# Patient Record
Sex: FEMALE | Race: WHITE | Employment: UNEMPLOYED | URBAN - METROPOLITAN AREA
[De-identification: names, ages, dates, MRNs, and addresses within clinical notes are randomized per-mention and may not be internally consistent; named-entity substitution may affect disease eponyms.]

---

## 2019-05-03 ENCOUNTER — HOSPITAL ENCOUNTER (OUTPATIENT)
Dept: LAB | Age: 59
Discharge: HOME | End: 2019-05-03
Payer: MEDICAID

## 2019-05-03 ENCOUNTER — OFFICE VISIT (OUTPATIENT)
Dept: FAMILY MEDICINE CLINIC | Age: 59
End: 2019-05-03

## 2019-05-03 VITALS
DIASTOLIC BLOOD PRESSURE: 75 MMHG | OXYGEN SATURATION: 96 % | TEMPERATURE: 98.1 F | SYSTOLIC BLOOD PRESSURE: 126 MMHG | BODY MASS INDEX: 32.43 KG/M2 | HEART RATE: 107 BPM | RESPIRATION RATE: 16 BRPM | HEIGHT: 63 IN | WEIGHT: 183 LBS

## 2019-05-03 DIAGNOSIS — E11.65 TYPE 2 DIABETES MELLITUS WITH HYPERGLYCEMIA, WITHOUT LONG-TERM CURRENT USE OF INSULIN (HCC): ICD-10-CM

## 2019-05-03 DIAGNOSIS — Z12.31 OTHER SCREENING MAMMOGRAM: ICD-10-CM

## 2019-05-03 DIAGNOSIS — Z00.00 WELL WOMAN EXAM (NO GYNECOLOGICAL EXAM): Primary | ICD-10-CM

## 2019-05-03 DIAGNOSIS — Z00.00 WELL WOMAN EXAM (NO GYNECOLOGICAL EXAM): ICD-10-CM

## 2019-05-03 DIAGNOSIS — Z11.59 NEED FOR HEPATITIS C SCREENING TEST: ICD-10-CM

## 2019-05-03 DIAGNOSIS — B37.9 YEAST INFECTION: ICD-10-CM

## 2019-05-03 PROCEDURE — 87624 HPV HI-RISK TYP POOLED RSLT: CPT

## 2019-05-03 PROCEDURE — 88175 CYTOPATH C/V AUTO FLUID REDO: CPT

## 2019-05-03 RX ORDER — RANITIDINE 150 MG/1
150 TABLET, FILM COATED ORAL 2 TIMES DAILY
COMMUNITY
End: 2019-05-28 | Stop reason: SDUPTHER

## 2019-05-03 RX ORDER — NYSTATIN 100000 U/G
CREAM TOPICAL 2 TIMES DAILY
Qty: 30 G | Refills: 0 | Status: SHIPPED | OUTPATIENT
Start: 2019-05-03 | End: 2019-05-28 | Stop reason: SDUPTHER

## 2019-05-03 RX ORDER — LEVOTHYROXINE SODIUM 100 UG/1
TABLET ORAL
COMMUNITY
End: 2019-05-28 | Stop reason: SDUPTHER

## 2019-05-03 NOTE — PROGRESS NOTES
Subjective:  
63 y.o. female for Well Woman Check. She is postmenopausal. 
Social History: not sexually active. Pertinent past medical hstory: below. There are no active problems to display for this patient. Current Outpatient Medications Medication Sig Dispense Refill  raNITIdine (ZANTAC) 150 mg tablet Take 150 mg by mouth two (2) times a day.  levothyroxine (SYNTHROID) 100 mcg tablet Take  by mouth Daily (before breakfast).  dicyclomine HCl (DICYCLOMINE PO) Take  by mouth.  nystatin (MYCOSTATIN) topical cream Apply  to affected area two (2) times a day. 30 g 0 No Known Allergies Past Medical History:  
Diagnosis Date  Diabetes ([address]  HTN (hypertension)  Obesity (BMI 35.0-39.9 without comorbidity) History reviewed. No pertinent surgical history. Family History Problem Relation Age of Onset  Diabetes Mother  Heart Disease Mother Social History Tobacco Use  Smoking status: Never Smoker  Smokeless tobacco: Never Used Substance Use Topics  Alcohol use: Not Currently ROS:  Feeling well. No dyspnea or chest pain on exertion. No abdominal pain, change in bowel habits, black or bloody stools. No urinary tract symptoms. GYN ROS: no breast pain or new or enlarging lumps on self exam, no vaginal bleeding, no discharge or pelvic pain. Menopausal symptoms: none. No neurological complaints. Last DEXA scan and T, not ordered Last Colonoscopy: new patient Last Mammogram new patiwnt Objective:  
 
Visit Vitals /75 (BP 1 Location: Left arm, BP Patient Position: Sitting) Pulse (!) 107 Temp 98.1 °F (36.7 °C) (Oral) Resp 16 Ht 5' 3\" (1.6 m) Wt 183 lb (83 kg) SpO2 96% BMI 32.42 kg/m² The patient appears well, alert, oriented x 3, in no distress. ENT normal.  Neck supple. No adenopathy or thyromegaly. SAWYER. Lungs are clear, good air entry, no wheezes, rhonchi or rales.  S1 and S2 normal, no murmurs, regular rate and rhythm. Abdomen soft without tenderness, guarding, mass or organomegaly. Extremities show no edema, normal peripheral pulses. Neurological is normal, no focal findings. BREAST EXAM: breasts appear normal, no suspicious masses, no skin or nipple changes or axillary nodes PELVIC EXAM: normal external genitalia, vulva, vagina, cervix, uterus and adnexa, skin with yeast infection Assessment/Plan:  
well woman 
postmenopausal 
pap smear 
return annually or prn 
  ICD-10-CM ICD-9-CM 1. Well woman exam (no gynecological exam) Z00.00 V70.0 PAP IG, HPV AND RFX HPV 73/40,70(997488) 2. Other screening mammogram Z12.31 V76.12 CANCELED: [facility] MAMMOGRAM DIAG BILAT SAME DAY INCL CAD 3. BMI 31.0-31.9,adult Z68.31 V85.31   
4. Type 2 diabetes mellitus with hyperglycemia, without long-term current use of insulin (HCC) E11.65 250.00 LIPID PANEL  
  995.59 METABOLIC PANEL, COMPREHENSIVE  
   HEMOGLOBIN A1C WITH EAG 5. Need for hepatitis C screening test Z11.59 V73.89 HEPATITIS C AB  
6. Yeast infection B37.9 112.9 nystatin (MYCOSTATIN) topical cream  
.follow up in 1 week Pt was given an after visit summary which includes diagnosis, current medicines and vital and voiced understanding of treatment plan

## 2019-05-03 NOTE — PROGRESS NOTES
Cedric Cabrera is a 63 y.o. female Exam RM: 10 Health Maintenance Due Topic Date Due  
 Hepatitis C Screening  1960  
 DTaP/Tdap/Td series (1 - Tdap) 11/27/1982  PAP AKA CERVICAL CYTOLOGY  11/27/1982  Shingrix Vaccine Age 50> (1 of 2) 11/27/2011  BREAST CANCER SCRN MAMMOGRAM  11/27/2011  
 FOBT Q 1 YEAR AGE 50-75  11/27/2011 Visit Vitals /75 (BP 1 Location: Left arm, BP Patient Position: Sitting) Pulse (!) 107 Temp 98.1 °F (36.7 °C) (Oral) Resp 16 Ht 5' 3\" (1.6 m) Wt 183 lb (83 kg) SpO2 96% BMI 32.42 kg/m²

## 2019-05-05 LAB
ALBUMIN SERPL-MCNC: 4.2 G/DL (ref 3.5–5.5)
ALBUMIN/GLOB SERPL: 1.6 {RATIO} (ref 1.2–2.2)
ALP SERPL-CCNC: 147 IU/L (ref 39–117)
ALT SERPL-CCNC: 13 IU/L (ref 0–32)
AST SERPL-CCNC: 11 IU/L (ref 0–40)
BILIRUB SERPL-MCNC: 0.2 MG/DL (ref 0–1.2)
BUN SERPL-MCNC: 9 MG/DL (ref 6–24)
BUN/CREAT SERPL: 13 (ref 9–23)
CALCIUM SERPL-MCNC: 9.2 MG/DL (ref 8.7–10.2)
CHLORIDE SERPL-SCNC: 94 MMOL/L (ref 96–106)
CHOLEST SERPL-MCNC: 222 MG/DL (ref 100–199)
CO2 SERPL-SCNC: 22 MMOL/L (ref 20–29)
CREAT SERPL-MCNC: 0.69 MG/DL (ref 0.57–1)
EST. AVERAGE GLUCOSE BLD GHB EST-MCNC: >398 MG/DL
GLOBULIN SER CALC-MCNC: 2.6 G/DL (ref 1.5–4.5)
GLUCOSE SERPL-MCNC: 439 MG/DL (ref 65–99)
HBA1C MFR BLD: >15.5 % (ref 4.8–5.6)
HCV AB S/CO SERPL IA: <0.1 S/CO RATIO (ref 0–0.9)
HDLC SERPL-MCNC: 62 MG/DL
INTERPRETATION, 910389: NORMAL
LDLC SERPL CALC-MCNC: 135 MG/DL (ref 0–99)
POTASSIUM SERPL-SCNC: 4.9 MMOL/L (ref 3.5–5.2)
PROT SERPL-MCNC: 6.8 G/DL (ref 6–8.5)
SODIUM SERPL-SCNC: 135 MMOL/L (ref 134–144)
TRIGL SERPL-MCNC: 124 MG/DL (ref 0–149)
VLDLC SERPL CALC-MCNC: 25 MG/DL (ref 5–40)

## 2019-05-10 ENCOUNTER — TELEPHONE (OUTPATIENT)
Dept: FAMILY MEDICINE CLINIC | Age: 59
End: 2019-05-10

## 2019-05-10 RX ORDER — METFORMIN HYDROCHLORIDE 500 MG/1
TABLET, EXTENDED RELEASE ORAL
Qty: 120 TAB | Refills: 2 | Status: SHIPPED | OUTPATIENT
Start: 2019-05-10 | End: 2019-11-08 | Stop reason: SDUPTHER

## 2019-05-10 NOTE — TELEPHONE ENCOUNTER
Ariane Mathews, MEKA Sloan LPN   Caller: Unspecified (Today, 10:09 AM)             Spoke with son and lab results given to him

## 2019-05-10 NOTE — TELEPHONE ENCOUNTER
----- Message from Adrian Sebastian sent at 5/10/2019  7:58 AM EDT -----  Regarding: Dr. Burt Mcgee pt's daughter is returning an urgent call back to the office. She stated she  is going into surgery in 5 minutes. [phone] or [phone].

## 2019-05-10 NOTE — TELEPHONE ENCOUNTER
Pt.'s son is calling requesting a call back in regards to get some lab results .      Best call # [phone]

## 2019-05-17 ENCOUNTER — OFFICE VISIT (OUTPATIENT)
Dept: FAMILY MEDICINE CLINIC | Age: 59
End: 2019-05-17

## 2019-05-17 VITALS
BODY MASS INDEX: 31.82 KG/M2 | HEART RATE: 111 BPM | SYSTOLIC BLOOD PRESSURE: 126 MMHG | RESPIRATION RATE: 16 BRPM | HEIGHT: 63 IN | OXYGEN SATURATION: 97 % | DIASTOLIC BLOOD PRESSURE: 70 MMHG | TEMPERATURE: 98.3 F | WEIGHT: 179.6 LBS

## 2019-05-17 DIAGNOSIS — R07.81 PAINFUL RIB: ICD-10-CM

## 2019-05-17 DIAGNOSIS — E78.2 MIXED HYPERLIPIDEMIA: Primary | ICD-10-CM

## 2019-05-17 DIAGNOSIS — E11.65 TYPE 2 DIABETES MELLITUS WITH HYPERGLYCEMIA, WITHOUT LONG-TERM CURRENT USE OF INSULIN (HCC): ICD-10-CM

## 2019-05-17 DIAGNOSIS — F99 PSYCHIATRIC DISORDER: ICD-10-CM

## 2019-05-17 RX ORDER — ATORVASTATIN CALCIUM 20 MG/1
20 TABLET, FILM COATED ORAL DAILY
Qty: 30 TAB | Refills: 3 | Status: SHIPPED | OUTPATIENT
Start: 2019-05-17 | End: 2019-12-01 | Stop reason: SDUPTHER

## 2019-05-17 NOTE — PROGRESS NOTES
All health maintenance and other pertinent information has been reviewed in preparation for today's office visit. Patient presents in the office today for:    Chief Complaint   Patient presents with    Follow-up     2 week follow up (HTN, Diabetes)    Rib Pain     Pt c/o bi-lat rib cage pain  which has been occuring over the last 5 months. Pain radiates to back on both sides Also c/o SOB       1. Have you been to the ER, urgent care clinic since your last visit? Hospitalized since your last visit? No    2. Have you seen or consulted any other health care providers outside of the [address] [place] [address] since your last visit? Include any pap smears or colon screening.  No

## 2019-05-17 NOTE — PROGRESS NOTES
HISTORY OF PRESENT ILLNESS  Benji Marte is a 63 y.o. female. HPI: Patient is following up diabetes and hyperlipidemia. Her HA1C was 15.last office visit currently she is not taking her medication due to fasting. Patient was advised she shouldn't be fasting and that she must taking her medications as prescribed. She also has psychological problem , and taking medication given in [place], she needs to follow up with a psychiatrist.   complaining of bilateral rib pain x 1-2 weeks. Past Medical History:   Diagnosis Date    Diabetes ([address]     HTN (hypertension)     Obesity (BMI 35.0-39.9 without comorbidity)    History reviewed. No pertinent surgical history. No Known Allergies    Current Outpatient Medications:     atorvastatin (LIPITOR) 20 mg tablet, Take 1 Tab by mouth daily. , Disp: 30 Tab, Rfl: 3    metFORMIN ER (GLUCOPHAGE XR) 500 mg tablet, Take 2 pills twice a day, with breakfast and dinner, Disp: 120 Tab, Rfl: 2    raNITIdine (ZANTAC) 150 mg tablet, Take 150 mg by mouth two (2) times a day., Disp: , Rfl:     levothyroxine (SYNTHROID) 100 mcg tablet, Take  by mouth Daily (before breakfast). , Disp: , Rfl:     dicyclomine HCl (DICYCLOMINE PO), Take  by mouth two (2) times a day. Indications: Pt unsure of dosage, Disp: , Rfl:     nystatin (MYCOSTATIN) topical cream, Apply  to affected area two (2) times a day., Disp: 30 g, Rfl: 0  Review of Systems   Constitutional: Negative. Respiratory: Negative. Cardiovascular: Negative. Gastrointestinal: Negative. Blood pressure 126/70, pulse (!) 111, temperature 98.3 °F (36.8 °C), temperature source Oral, resp. rate 16, height 5' 3\" (1.6 m), weight 179 lb 9.6 oz (81.5 kg), SpO2 97 %. Physical Exam   Constitutional: No distress. HENT:   Mouth/Throat: Oropharynx is clear and moist.   Neck: Normal range of motion. Neck supple. Cardiovascular: Normal rate and regular rhythm. No murmur heard.   Pulmonary/Chest: Effort normal and breath sounds normal. She exhibits tenderness. Abdominal: Soft. Bowel sounds are normal.   Skin: Skin is warm and dry. Nursing note and vitals reviewed. ASSESSMENT and PLAN  Diagnoses and all orders for this visit:    1. Mixed hyperlipidemia  -     atorvastatin (LIPITOR) 20 mg tablet; Take 1 Tab by mouth daily. 2. Type 2 diabetes mellitus with hyperglycemia, without long-term current use of insulin (HCC)      Stop fasting     Take metformin 500 mg 2 tabs bid   3. Painful rib    4.  Psychiatric disorder  Refer to psychiatrist  Follow up in [facility]  Pt was given an after visit summary which includes diagnosis, current medicines and vital and voiced understanding of treatment plan

## 2019-05-17 NOTE — LETTER
NOTIFICATION RETURN TO WORK / SCHOOL 
 
5/17/2019 10:00 AM 
 
Ms. Benji Marte [address] [place] [id number] To Whom It May Concern: 
 
Benji Marte is currently under the care of DEBORAH Jc. She is not able to work due to diabetes, vision loss, psychiatric problem If there are questions or concerns please have the patient contact our office. Sincerely, Isidra Farrell NP

## 2019-05-20 RX ORDER — RANITIDINE 150 MG/1
150 TABLET, FILM COATED ORAL 2 TIMES DAILY
Qty: 30 TAB | Refills: 0 | Status: CANCELLED | OUTPATIENT
Start: 2019-05-20

## 2019-05-20 RX ORDER — CHLORPROMAZINE HYDROCHLORIDE 100 MG/1
100 TABLET, FILM COATED ORAL 3 TIMES DAILY
Qty: 30 TAB | Refills: 0 | Status: CANCELLED | OUTPATIENT
Start: 2019-05-20

## 2019-05-20 RX ORDER — PROMETHAZINE HYDROCHLORIDE 25 MG/1
25 TABLET ORAL
Qty: 30 TAB | Refills: 0 | Status: CANCELLED | OUTPATIENT
Start: 2019-05-20

## 2019-05-20 RX ORDER — FLUPHENAZINE HYDROCHLORIDE 5 MG/1
5 TABLET ORAL DAILY
Qty: 30 TAB | Refills: 0 | Status: CANCELLED | OUTPATIENT
Start: 2019-05-20

## 2019-05-28 DIAGNOSIS — B37.9 YEAST INFECTION: ICD-10-CM

## 2019-05-28 RX ORDER — LEVOTHYROXINE SODIUM 100 UG/1
100 TABLET ORAL
Qty: 30 TAB | Refills: 5 | Status: SHIPPED | OUTPATIENT
Start: 2019-05-28

## 2019-05-28 RX ORDER — NYSTATIN 100000 U/G
CREAM TOPICAL 2 TIMES DAILY
Qty: 30 G | Refills: 0 | Status: SHIPPED | OUTPATIENT
Start: 2019-05-28 | End: 2019-11-16

## 2019-05-28 RX ORDER — RANITIDINE 150 MG/1
150 TABLET, FILM COATED ORAL 2 TIMES DAILY
Qty: 60 TAB | Refills: 5 | Status: SHIPPED | OUTPATIENT
Start: 2019-05-28

## 2019-05-28 NOTE — TELEPHONE ENCOUNTER
Pt. called in today requesting a 90-days supply refill on the following meds. Pharm on file verified. LOV 05/17/2019  Last refill. 05/03/2019    Requested Prescriptions     Pending Prescriptions Disp Refills    levothyroxine (SYNTHROID) 100 mcg tablet       Sig: Take  by mouth Daily (before breakfast).  raNITIdine (ZANTAC) 150 mg tablet       Sig: Take 1 Tab by mouth two (2) times a day.  nystatin (MYCOSTATIN) topical cream 30 g 0     Sig: Apply  to affected area two (2) times a day.

## 2019-06-04 ENCOUNTER — HOSPITAL ENCOUNTER (EMERGENCY)
Age: 59
Discharge: HOME | End: 2019-06-04
Attending: STUDENT IN AN ORGANIZED HEALTH CARE EDUCATION/TRAINING PROGRAM
Payer: COMMERCIAL

## 2019-06-04 VITALS
RESPIRATION RATE: 18 BRPM | OXYGEN SATURATION: 93 % | HEART RATE: 107 BPM | DIASTOLIC BLOOD PRESSURE: 73 MMHG | BODY MASS INDEX: 31.13 KG/M2 | TEMPERATURE: 98.3 F | SYSTOLIC BLOOD PRESSURE: 132 MMHG | WEIGHT: 175.71 LBS

## 2019-06-04 DIAGNOSIS — Z76.0 MEDICATION REFILL: ICD-10-CM

## 2019-06-04 DIAGNOSIS — G24.01 TARDIVE DYSKINESIA: Primary | ICD-10-CM

## 2019-06-04 DIAGNOSIS — B37.9 YEAST INFECTION: ICD-10-CM

## 2019-06-04 LAB
ALBUMIN SERPL-MCNC: 3.4 G/DL (ref 3.5–5)
ALBUMIN/GLOB SERPL: 1 {RATIO} (ref 1.1–2.2)
ALP SERPL-CCNC: 141 U/L (ref 45–117)
ALT SERPL-CCNC: 17 U/L (ref 12–78)
ANION GAP SERPL CALC-SCNC: 8 MMOL/L (ref 5–15)
AST SERPL-CCNC: 10 U/L (ref 15–37)
BASOPHILS # BLD: 0 K/UL (ref 0–0.1)
BASOPHILS NFR BLD: 1 % (ref 0–1)
BILIRUB SERPL-MCNC: 0.3 MG/DL (ref 0.2–1)
BUN SERPL-MCNC: 19 MG/DL (ref 6–20)
BUN/CREAT SERPL: 26 (ref 12–20)
CALCIUM SERPL-MCNC: 9 MG/DL (ref 8.5–10.1)
CHLORIDE SERPL-SCNC: 99 MMOL/L (ref 97–108)
CO2 SERPL-SCNC: 26 MMOL/L (ref 21–32)
CREAT SERPL-MCNC: 0.74 MG/DL (ref 0.55–1.02)
DIFFERENTIAL METHOD BLD: ABNORMAL
EOSINOPHIL # BLD: 0.3 K/UL (ref 0–0.4)
EOSINOPHIL NFR BLD: 5 % (ref 0–7)
ERYTHROCYTE [DISTWIDTH] IN BLOOD BY AUTOMATED COUNT: 16 % (ref 11.5–14.5)
GLOBULIN SER CALC-MCNC: 3.5 G/DL (ref 2–4)
GLUCOSE SERPL-MCNC: 473 MG/DL (ref 65–100)
HCT VFR BLD AUTO: 40.3 % (ref 35–47)
HGB BLD-MCNC: 12.9 G/DL (ref 11.5–16)
LYMPHOCYTES # BLD: 1.6 K/UL (ref 0.8–3.5)
LYMPHOCYTES NFR BLD: 25 % (ref 12–49)
MCH RBC QN AUTO: 25.2 PG (ref 26–34)
MCHC RBC AUTO-ENTMCNC: 32 G/DL (ref 30–36.5)
MCV RBC AUTO: 78.7 FL (ref 80–99)
MONOCYTES # BLD: 0.3 K/UL (ref 0–1)
MONOCYTES NFR BLD: 5 % (ref 5–13)
NEUTS SEG # BLD: 4.1 K/UL (ref 1.8–8)
NEUTS SEG NFR BLD: 64 % (ref 32–75)
PLATELET # BLD AUTO: 261 K/UL (ref 150–400)
PMV BLD AUTO: 11.5 FL (ref 8.9–12.9)
POTASSIUM SERPL-SCNC: 4 MMOL/L (ref 3.5–5.1)
PROT SERPL-MCNC: 6.9 G/DL (ref 6.4–8.2)
RBC # BLD AUTO: 5.12 M/UL (ref 3.8–5.2)
SODIUM SERPL-SCNC: 133 MMOL/L (ref 136–145)
WBC # BLD AUTO: 6.4 K/UL (ref 3.6–11)

## 2019-06-04 PROCEDURE — 85025 COMPLETE CBC W/AUTO DIFF WBC: CPT

## 2019-06-04 PROCEDURE — 36415 COLL VENOUS BLD VENIPUNCTURE: CPT

## 2019-06-04 PROCEDURE — 93005 ELECTROCARDIOGRAM TRACING: CPT

## 2019-06-04 PROCEDURE — 99284 EMERGENCY DEPT VISIT MOD MDM: CPT

## 2019-06-04 PROCEDURE — 80053 COMPREHEN METABOLIC PANEL: CPT

## 2019-06-04 RX ORDER — FLUPHENAZINE HYDROCHLORIDE 5 MG/1
5 TABLET ORAL 2 TIMES DAILY
Qty: 28 TAB | Refills: 0 | Status: SHIPPED | OUTPATIENT
Start: 2019-06-04 | End: 2019-06-18

## 2019-06-04 RX ORDER — CHLORPROMAZINE HYDROCHLORIDE 100 MG/1
100 TABLET, FILM COATED ORAL 3 TIMES DAILY
Qty: 42 TAB | Refills: 0 | Status: SHIPPED | OUTPATIENT
Start: 2019-06-04 | End: 2019-06-18

## 2019-06-04 RX ORDER — PROMETHAZINE HYDROCHLORIDE 25 MG/1
25 TABLET ORAL
Qty: 14 TAB | Refills: 0 | Status: SHIPPED | OUTPATIENT
Start: 2019-06-04 | End: 2019-06-18

## 2019-06-04 RX ORDER — PROMETHAZINE HYDROCHLORIDE 25 MG/1
25 TABLET ORAL
COMMUNITY
End: 2019-06-04

## 2019-06-04 RX ORDER — FLUPHENAZINE HYDROCHLORIDE 5 MG/1
5 TABLET ORAL 2 TIMES DAILY
COMMUNITY
End: 2019-06-04

## 2019-06-04 RX ORDER — CHLORPROMAZINE HYDROCHLORIDE 100 MG/1
100 TABLET, FILM COATED ORAL 3 TIMES DAILY
COMMUNITY
End: 2019-06-04

## 2019-06-04 RX ORDER — LEVOTHYROXINE SODIUM 100 UG/1
100 TABLET ORAL
Qty: 30 TAB | Refills: 5 | Status: CANCELLED | OUTPATIENT
Start: 2019-06-04

## 2019-06-04 RX ORDER — NYSTATIN 100000 U/G
CREAM TOPICAL 2 TIMES DAILY
Qty: 30 G | Refills: 0 | Status: CANCELLED | OUTPATIENT
Start: 2019-06-04

## 2019-06-04 NOTE — ED PROVIDER NOTES
Patient is a 81-year-old female presenting to the emergency department for a medication refill. Patient recently immigrated to the [place] in February. Patient is from [place] and per  the daughter was bit translating the patient she states that for the last 11 years she had been in a [facility] sanctioned hospital in [facility] being treated for PTSD and psychosis due to [place] and [place]. Her medications that she was taking her PTSD ran out yesterday and the daughter states that since February she been trying to get a new appointment with a psychiatrist here in the [address] earlier she could see her would be September of this year. Patient takes thorazine 100 mg x 3 day, Fluphenazine 5 mg BID. Daughter states that she ran out of meds yesterday and does not know what to do bc she is unable to get in to see a pyschiatrist.  Pt denies any fevers, chills, body aches, chest pain, n/v/d           Past Medical History:   Diagnosis Date    [facility]     HTN (hypertension)     Obesity (BMI 35.0-39.9 without comorbidity)     Psychotic disorder ([facility]        History reviewed. No pertinent surgical history.       Family History:   Problem Relation Age of Onset    Diabetes Mother     Heart Disease Mother        Social History     Socioeconomic History    Marital status:      Spouse name: Not on file    Number of children: Not on file    Years of education: Not on file    Highest education level: Not on file   Occupational History    Not on file   Social Needs    Financial resource strain: Not on file    Food insecurity:     Worry: Not on file     Inability: Not on file    Transportation needs:     Medical: Not on file     Non-medical: Not on file   Tobacco Use    Smoking status: Never Smoker    Smokeless tobacco: Never Used   Substance and Sexual Activity    Alcohol use: Not Currently    Drug use: Not Currently    Sexual activity: Not Currently   Lifestyle    Physical activity:     Days per week: Not on file     Minutes per session: Not on file    Stress: Not on file   Relationships    Social connections:     Talks on phone: Not on file     Gets together: Not on file     Attends Protestant service: Not on file     Active member of club or organization: Not on file     Attends meetings of clubs or organizations: Not on file     Relationship status: Not on file    Intimate partner violence:     Fear of current or ex partner: Not on file     Emotionally abused: Not on file     Physically abused: Not on file     Forced sexual activity: Not on file   Other Topics Concern    Not on file   Social History Narrative    Not on file         ALLERGIES: Patient has no known allergies. Review of Systems   Constitutional: Negative for activity change. Neurological: Positive for tremors. Psychiatric/Behavioral: Negative for agitation, behavioral problems, confusion, decreased concentration, dysphoric mood, hallucinations, self-injury, sleep disturbance and suicidal ideas. The patient is nervous/anxious. The patient is not hyperactive. All other systems reviewed and are negative. Vitals:    06/04/19 1138 06/04/19 1221 06/04/19 1230   BP: 136/74 133/71 120/78   Pulse: (!) 114     Resp: 18     Temp: 98.3 °F (36.8 °C)     SpO2: 96% 96% 95%   Weight: 79.7 kg (175 lb 11.3 oz)              Physical Exam   Constitutional: She is oriented to person, place, and time. She appears well-developed and well-nourished. HENT:   Head: Normocephalic and atraumatic. Eyes: Pupils are equal, round, and reactive to light. EOM are normal.   Neck: Normal range of motion. Neck supple. Pulmonary/Chest: Effort normal.   Abdominal: Soft. Musculoskeletal: Normal range of motion. Neurological: She is alert and oriented to person, place, and time. Coordination (EPS of jaw) abnormal.   Skin: Skin is warm and dry. Psychiatric: She has a normal mood and affect.  Her behavior is normal. Thought content normal.   Nursing note and vitals reviewed. MDM  Number of Diagnoses or Management Options  Medication refill:   Tardive dyskinesia:   Diagnosis management comments: A/P:  Tardive dyskinesia 2/2 abrupt stop of medications. 62 y/o female presenting to ED for medication refill as pt finished her last dose of thorazine and fluphenazine yesterday and unable to get Rx filled or see pyschiatry. CBC, CMP, ECG, consult to [facility]. ECG shows no evidence of QT prolongation, labs unremarkable. Discussed case with BSMART and able to find appt for pt next week outpatient. Will restart pt's meds until she can be seen by outpatient pyschiatry       Amount and/or Complexity of Data Reviewed  Clinical lab tests: ordered and reviewed  Obtain history from someone other than the patient: yes  Discuss the patient with other providers: yes  Independent visualization of images, tracings, or specimens: yes    Risk of Complications, Morbidity, and/or Mortality  Presenting problems: moderate  Diagnostic procedures: moderate  Management options: moderate    Patient Progress  Patient progress: stable         Procedures      ED EKG interpretation:  Rhythm: sinus tachycardia; and regular . Rate (approx.): 104; Axis: normal; P wave: normal; QRS interval: normal ; ST/T wave: normal; in  Lead: II ; Other findings: normal.   EKG documented by Margaret Gómez MD,       Discussed plan of care with daughter who understands she will have an appt next Wed at 1:00 pm.  Will refill meds for 2 weeks to allow for her to get her mother to MD for evaluation.

## 2019-06-04 NOTE — TELEPHONE ENCOUNTER
Patients daughter is calling stating her medications are not yet refilled, informed her that they were refilled on 5/28/19 at 12:24pm.  Malu Bella to call the pharmacy to check  Daughter understands   [facility]# [phone]

## 2019-06-04 NOTE — BSMART NOTE
Patient presents with daughter to ER due to being out of her prolixin and thorazine that she was prescribed in [place]. Patient is starting to have effects from running out and they are unable to secure a psychiatrist appointment until September. Dr. Lissette Vick asked for help. Discussed options and made phone calls. Contacted [address] [place] [address] who has a nurse practitioner that can see the patient for $250 for the initial visit and $100 for each session afterwards. This would give the patient the ability to seek medication until a provider who accepts her insurance is able to be seen. Discussed with Dr. Lissette Vick who spoke with family. They are in agreement with plan and would like to take the appointment Wednesday at 1pm. Dr Lissette Vick will consider writing medication for 1 week until patient is seen. Patient denies suicidal and homicidal ideation and does not meet admission criteria as this was a resource/referral issue. A BSMART consult/assessment was not needed due to the above appointment. Dr. Lissette Vick to discharge once patient is medically cleared.     Gely Matute L[facility]

## 2019-06-04 NOTE — ED TRIAGE NOTES
Patient presents ambulatory to treatment area with a steady gait accompanied by daughter. Daughter states patient is new to this country as of February. Patient takes medication for PTSD and cannot be seen by psychiatry until September or October. Patient is non [place] speaking. Patient bottom jaw noted to be shaking in triage.

## 2019-06-04 NOTE — DISCHARGE INSTRUCTIONS
Patient Education        Tardive Dyskinesia (TD): Care Instructions  Your Care Instructions    Tardive dyskinesia (TD) is a movement disorder. It's caused by using medicines called antipsychotics, often for a long time. Doctors use these medicines to treat mental health disorders such as schizophrenia. Some people can take these medicines without getting TD. But for those people who do get it, the symptoms can cause distress. TD causes a person to repeat the same movement over and over without being able to stop. If you have TD, you might have symptoms such as:  · Repeated chewing motions. · Smacking your lips. · Thrusting your tongue out of your mouth. · Twitching your tongue. · Quick and jerky movements (tics) of your head. Treatment depends on how much you need the medicine that causes the symptoms. If symptoms are causing big problems for you, your doctor may have you lower the dose or stop the medicine. Or your doctor may switch you to a different medicine. Other medicines sometimes can help relieve the TD symptoms. But you may still have symptoms, even if you stop taking the antipsychotic medicine. Follow-up care is a key part of your treatment and safety. Be sure to make and go to all appointments, and call your doctor if you are having problems. It's also a good idea to know your test results and keep a list of the medicines you take. How can you care for yourself at home? · Be safe with medicines. Take your medicines exactly as prescribed. Call your doctor if you think you are having a problem with your medicine. · Don't stop taking your medicine unless you and your doctor have discussed how this change might affect you. If you have trouble taking your medicine or feel that you don't need to take it, talk to your doctor. Your doctor may be able to change the medicine or the amount you take. · Try not to isolate yourself if you are self-conscious about the uncontrolled motion.  Tell your family and friends about TD and how it affects you. · If you haven't done so yet, talk to your doctor about treatment for your TD symptoms. · Ask your doctor, counselor, or other health professional for help finding a support group. Look for one that works for you. It can help to talk to others who have dealt with the same problems as you. When should you call for help? Watch closely for changes in your health, and be sure to contact your doctor if:    · You have new TD symptoms, or your symptoms get worse.     · You do not get better as expected. Where can you learn more? Go to http://clarence-dereje.info/. Enter T105 in the search box to learn more about \"Tardive Dyskinesia (TD): Care Instructions. \"  Current as of: September 11, 2018  Content Version: 11.9  © 6694-0269 [employer]. Care instructions adapted under license by [employer] (which disclaims liability or warranty for this information). If you have questions about a medical condition or this instruction, always ask your healthcare professional. [address] any warranty or liability for your use of this information.

## 2019-06-04 NOTE — ED NOTES
The patient was discharged home by provider in stable condition. The patient is alert and oriented, in no respiratory distress and discharge vital signs obtained. The patient's diagnosis, condition and treatment were explained to the patient and her daughter. The patient's daughter expressed understanding. Three prescriptions given. No work/school note given. A discharge plan has been developed. A  was not involved in the process. Aftercare instructions were given. Pt ambulatory out of the ED.

## 2019-06-04 NOTE — TELEPHONE ENCOUNTER
----- Message from Isabel Nair sent at 6/3/2019  5:21 PM EDT -----  Regarding:  Sophia Fernandes (daughter) is requesting her mothers Rx Levothyroxine 100mg, Ranitidine 150mg, Nystatin has not been sent to ([address] [phone]). Her mother is completely out of Rx and has reached out to the practice several times. Best contact is [phone].    ..  ..  Requested Prescriptions     Pending Prescriptions Disp Refills    levothyroxine (SYNTHROID) 100 mcg tablet 30 Tab 5     Sig: Take 1 Tab by mouth Daily (before breakfast).  nystatin (MYCOSTATIN) topical cream 30 g 0     Sig: Apply  to affected area two (2) times a day.    ;

## 2019-06-05 LAB
ATRIAL RATE: 104 BPM
CALCULATED P AXIS, ECG09: 37 DEGREES
CALCULATED R AXIS, ECG10: 37 DEGREES
CALCULATED T AXIS, ECG11: 28 DEGREES
DIAGNOSIS, 93000: NORMAL
P-R INTERVAL, ECG05: 136 MS
Q-T INTERVAL, ECG07: 352 MS
QRS DURATION, ECG06: 74 MS
QTC CALCULATION (BEZET), ECG08: 462 MS
VENTRICULAR RATE, ECG03: 104 BPM

## 2019-08-09 ENCOUNTER — OFFICE VISIT (OUTPATIENT)
Dept: FAMILY MEDICINE CLINIC | Age: 59
End: 2019-08-09

## 2019-08-09 VITALS
OXYGEN SATURATION: 95 % | HEIGHT: 63 IN | HEART RATE: 96 BPM | DIASTOLIC BLOOD PRESSURE: 59 MMHG | TEMPERATURE: 98 F | SYSTOLIC BLOOD PRESSURE: 110 MMHG | BODY MASS INDEX: 29.66 KG/M2 | WEIGHT: 167.4 LBS | RESPIRATION RATE: 18 BRPM

## 2019-08-09 DIAGNOSIS — E78.2 MIXED HYPERLIPIDEMIA: ICD-10-CM

## 2019-08-09 DIAGNOSIS — E11.65 TYPE 2 DIABETES MELLITUS WITH HYPERGLYCEMIA, WITHOUT LONG-TERM CURRENT USE OF INSULIN (HCC): Primary | ICD-10-CM

## 2019-08-09 DIAGNOSIS — F99 PSYCHIATRIC DISORDER: ICD-10-CM

## 2019-08-09 DIAGNOSIS — Z12.31 SCREENING MAMMOGRAM, ENCOUNTER FOR: ICD-10-CM

## 2019-08-09 DIAGNOSIS — E03.9 ACQUIRED HYPOTHYROIDISM: ICD-10-CM

## 2019-08-09 RX ORDER — INSULIN PUMP SYRINGE, 3 ML
EACH MISCELLANEOUS
Qty: 1 KIT | Refills: 0 | Status: SHIPPED | OUTPATIENT
Start: 2019-08-09 | End: 2019-11-18

## 2019-08-09 NOTE — PROGRESS NOTES
HISTORY OF PRESENT ILLNESS  Linda Sidhu is a 63 y.o. female. HPI:Cardiovascular Review  The patient has diabetes, hyperlipidemia and obesity. She reports taking medications as instructed, no medication side effects noted. Diet and Lifestyle: not attempting to follow a low fat, low cholesterol diet, not attempting to follow a low sodium diet, no formal exercise but active during the day. Lab review: labs reviewed and discussed with patient. Needs lab work for hypothyroidism. Due for mammogram.    Past Medical History:   Diagnosis Date    [facility] ([facility]     HTN (hypertension)     Obesity (BMI 35.0-39.9 without comorbidity)     Psychotic disorder ([facility]      History reviewed. No pertinent surgical history. No Known Allergies    Current Outpatient Medications:     Blood-Glucose Meter monitoring kit, With needles and test strips, Disp: 1 Kit, Rfl: 0    levothyroxine (SYNTHROID) 100 mcg tablet, Take 1 Tab by mouth Daily (before breakfast). , Disp: 30 Tab, Rfl: 5    raNITIdine (ZANTAC) 150 mg tablet, Take 1 Tab by mouth two (2) times a day., Disp: 60 Tab, Rfl: 5    atorvastatin (LIPITOR) 20 mg tablet, Take 1 Tab by mouth daily. , Disp: 30 Tab, Rfl: 3    metFORMIN ER (GLUCOPHAGE XR) 500 mg tablet, Take 2 pills twice a day, with breakfast and dinner, Disp: 120 Tab, Rfl: 2    dicyclomine HCl (DICYCLOMINE PO), Take  by mouth two (2) times a day. Indications: Pt unsure of dosage, Disp: , Rfl:     nystatin (MYCOSTATIN) topical cream, Apply  to affected area two (2) times a day., Disp: 30 g, Rfl: 0  Review of Systems   Constitutional: Negative. Respiratory: Negative. Cardiovascular: Negative. Gastrointestinal: Negative. Psychiatric/Behavioral: Positive for depression. Blood pressure 110/59, pulse 96, temperature 98 °F (36.7 °C), temperature source Oral, resp. rate 18, height 5' 3\" (1.6 m), weight 167 lb 6.4 oz (75.9 kg), SpO2 95 %. Body mass index is 29.65 kg/m².     Physical Exam Constitutional: No distress. HENT:   Mouth/Throat: Oropharynx is clear and moist.   Neck: Neck supple. Cardiovascular: Normal rate and regular rhythm. No murmur heard. Pulmonary/Chest: Effort normal and breath sounds normal.   Abdominal: Soft. Bowel sounds are normal.   Psychiatric: She has a normal mood and affect. Her behavior is normal.   Nursing note and vitals reviewed. ASSESSMENT and PLAN  Diagnoses and all orders for this visit:    1. Type 2 diabetes mellitus with hyperglycemia, without long-term current use of insulin (HCC)  -     METABOLIC PANEL, COMPREHENSIVE  -     HEMOGLOBIN A1C WITH EAG  -     MICROALBUMIN, UR, RAND W/ MICROALB/CREAT RATIO    2. Mixed hyperlipidemia  -     LIPID PANEL    3. BMI 31.0-31.9,adult      Diet and exercise    4. Psychiatric disorder    5. Acquired hypothyroidism  -     TSH 3RD GENERATION  -     T4, FREE    6. Screening mammogram, encounter for  -     [facility] MAMMO BI SCREENING INCL CAD; Future    Other orders  -     Blood-Glucose Meter monitoring kit;  With needles and test strips  Follow up in November  Pt was given an after visit summary which includes diagnosis, current medicines and vital and voiced understanding of treatment plan

## 2019-08-09 NOTE — PROGRESS NOTES
Chief Complaint   Patient presents with    Diabetes     3 mth f/u      1. Have you been to the ER, urgent care clinic since your last visit? Hospitalized since your last visit? No    2. Have you seen or consulted any other health care providers outside of the [address] [place] [address] since your last visit? Include any pap smears or colon screening.  Yes Where: [facility] 5/2019

## 2019-08-11 LAB
ALBUMIN SERPL-MCNC: 4.5 G/DL (ref 3.5–5.5)
ALBUMIN/GLOB SERPL: 2 {RATIO} (ref 1.2–2.2)
ALP SERPL-CCNC: 99 IU/L (ref 39–117)
ALT SERPL-CCNC: 13 IU/L (ref 0–32)
AST SERPL-CCNC: 7 IU/L (ref 0–40)
BILIRUB SERPL-MCNC: 0.4 MG/DL (ref 0–1.2)
BUN SERPL-MCNC: 11 MG/DL (ref 6–24)
BUN/CREAT SERPL: 17 (ref 9–23)
CALCIUM SERPL-MCNC: 9.4 MG/DL (ref 8.7–10.2)
CHLORIDE SERPL-SCNC: 97 MMOL/L (ref 96–106)
CHOLEST SERPL-MCNC: 117 MG/DL (ref 100–199)
CO2 SERPL-SCNC: 24 MMOL/L (ref 20–29)
CREAT SERPL-MCNC: 0.64 MG/DL (ref 0.57–1)
CREAT UR-MCNC: NORMAL MG/DL
EST. AVERAGE GLUCOSE BLD GHB EST-MCNC: 381 MG/DL
GLOBULIN SER CALC-MCNC: 2.3 G/DL (ref 1.5–4.5)
GLUCOSE SERPL-MCNC: 413 MG/DL (ref 65–99)
HBA1C MFR BLD: 14.9 % (ref 4.8–5.6)
HDLC SERPL-MCNC: 47 MG/DL
INTERPRETATION, 910389: NORMAL
LDLC SERPL CALC-MCNC: 46 MG/DL (ref 0–99)
MICROALBUMIN UR-MCNC: NORMAL
POTASSIUM SERPL-SCNC: 4.8 MMOL/L (ref 3.5–5.2)
PROT SERPL-MCNC: 6.8 G/DL (ref 6–8.5)
SODIUM SERPL-SCNC: 135 MMOL/L (ref 134–144)
T4 FREE SERPL-MCNC: 1.72 NG/DL (ref 0.82–1.77)
TRIGL SERPL-MCNC: 121 MG/DL (ref 0–149)
TSH SERPL DL<=0.005 MIU/L-ACNC: 0.73 UIU/ML (ref 0.45–4.5)
VLDLC SERPL CALC-MCNC: 24 MG/DL (ref 5–40)

## 2019-08-12 ENCOUNTER — TELEPHONE (OUTPATIENT)
Dept: FAMILY MEDICINE CLINIC | Age: 59
End: 2019-08-12

## 2019-08-12 RX ORDER — INSULIN GLARGINE 100 [IU]/ML
INJECTION, SOLUTION SUBCUTANEOUS
Qty: 3 ADJUSTABLE DOSE PRE-FILLED PEN SYRINGE | Refills: 3 | Status: SHIPPED | OUTPATIENT
Start: 2019-08-12 | End: 2019-11-18

## 2019-08-12 NOTE — TELEPHONE ENCOUNTER
Placed an outgoing call to patient's daughter (on PHI) to discuss new medications. Left VM for patient to return call to the office.

## 2019-08-13 RX ORDER — INSULIN PUMP SYRINGE, 3 ML
EACH MISCELLANEOUS
Qty: 1 KIT | Refills: 0 | Status: SHIPPED | OUTPATIENT
Start: 2019-08-13 | End: 2019-11-18

## 2019-08-13 RX ORDER — PEN NEEDLE, DIABETIC 30 GX3/16"
NEEDLE, DISPOSABLE MISCELLANEOUS
Qty: 1 PACKAGE | Refills: 11 | Status: SHIPPED | OUTPATIENT
Start: 2019-08-13 | End: 2019-11-18

## 2019-08-16 ENCOUNTER — TELEPHONE (OUTPATIENT)
Dept: FAMILY MEDICINE CLINIC | Age: 59
End: 2019-08-16

## 2019-08-16 NOTE — TELEPHONE ENCOUNTER
Placed an outgoing call to patient's daughter (on PHI) to reschedule missed appointment. Left VM for her to call the practice to reschedule.     Vy Velasquez, PharmD, [facility]

## 2019-10-23 ENCOUNTER — APPOINTMENT (OUTPATIENT)
Dept: MRI IMAGING | Age: 59
End: 2019-10-23
Attending: PHYSICIAN ASSISTANT
Payer: COMMERCIAL

## 2019-10-23 ENCOUNTER — HOSPITAL ENCOUNTER (EMERGENCY)
Age: 59
Discharge: HOME | End: 2019-10-23
Attending: EMERGENCY MEDICINE
Payer: COMMERCIAL

## 2019-10-23 ENCOUNTER — OFFICE VISIT (OUTPATIENT)
Dept: FAMILY MEDICINE CLINIC | Age: 59
End: 2019-10-23

## 2019-10-23 ENCOUNTER — APPOINTMENT (OUTPATIENT)
Dept: CT IMAGING | Age: 59
End: 2019-10-23
Attending: PHYSICIAN ASSISTANT
Payer: COMMERCIAL

## 2019-10-23 VITALS
BODY MASS INDEX: 27.18 KG/M2 | WEIGHT: 153.4 LBS | HEART RATE: 99 BPM | DIASTOLIC BLOOD PRESSURE: 60 MMHG | RESPIRATION RATE: 16 BRPM | HEIGHT: 63 IN | TEMPERATURE: 98.8 F | SYSTOLIC BLOOD PRESSURE: 110 MMHG | OXYGEN SATURATION: 97 %

## 2019-10-23 VITALS
SYSTOLIC BLOOD PRESSURE: 118 MMHG | OXYGEN SATURATION: 97 % | HEIGHT: 63 IN | DIASTOLIC BLOOD PRESSURE: 64 MMHG | WEIGHT: 153.4 LBS | BODY MASS INDEX: 27.18 KG/M2 | RESPIRATION RATE: 19 BRPM | TEMPERATURE: 98 F | HEART RATE: 81 BPM

## 2019-10-23 DIAGNOSIS — R47.01 EXPRESSIVE APHASIA: Primary | ICD-10-CM

## 2019-10-23 DIAGNOSIS — E11.65 TYPE 2 DIABETES MELLITUS WITH HYPERGLYCEMIA, WITHOUT LONG-TERM CURRENT USE OF INSULIN (HCC): ICD-10-CM

## 2019-10-23 DIAGNOSIS — R47.9 DIFFICULTY WITH SPEECH: Primary | ICD-10-CM

## 2019-10-23 LAB
ALBUMIN SERPL-MCNC: 3.2 G/DL (ref 3.5–5)
ALBUMIN/GLOB SERPL: 0.9 {RATIO} (ref 1.1–2.2)
ALP SERPL-CCNC: 137 U/L (ref 45–117)
ALT SERPL-CCNC: 17 U/L (ref 12–78)
ANION GAP SERPL CALC-SCNC: 8 MMOL/L (ref 5–15)
ARTERIAL PATENCY WRIST A: ABNORMAL
AST SERPL-CCNC: 10 U/L (ref 15–37)
ATRIAL RATE: 88 BPM
BASE EXCESS BLDV CALC-SCNC: 4 MMOL/L
BASOPHILS # BLD: 0 K/UL (ref 0–0.1)
BASOPHILS NFR BLD: 0 % (ref 0–1)
BDY SITE: ABNORMAL
BILIRUB SERPL-MCNC: 0.2 MG/DL (ref 0.2–1)
BODY TEMPERATURE: 98
BUN SERPL-MCNC: 14 MG/DL (ref 6–20)
BUN/CREAT SERPL: 25 (ref 12–20)
CALCIUM SERPL-MCNC: 9.4 MG/DL (ref 8.5–10.1)
CALCULATED P AXIS, ECG09: 13 DEGREES
CALCULATED R AXIS, ECG10: 35 DEGREES
CALCULATED T AXIS, ECG11: 27 DEGREES
CHLORIDE SERPL-SCNC: 101 MMOL/L (ref 97–108)
CO2 SERPL-SCNC: 25 MMOL/L (ref 21–32)
COMMENT, HOLDF: NORMAL
CREAT SERPL-MCNC: 0.55 MG/DL (ref 0.55–1.02)
DIAGNOSIS, 93000: NORMAL
DIFFERENTIAL METHOD BLD: NORMAL
EOSINOPHIL # BLD: 0.2 K/UL (ref 0–0.4)
EOSINOPHIL NFR BLD: 3 % (ref 0–7)
ERYTHROCYTE [DISTWIDTH] IN BLOOD BY AUTOMATED COUNT: 14.1 % (ref 11.5–14.5)
GAS FLOW.O2 O2 DELIVERY SYS: ABNORMAL L/MIN
GLOBULIN SER CALC-MCNC: 3.5 G/DL (ref 2–4)
GLUCOSE BLD STRIP.AUTO-MCNC: 290 MG/DL (ref 65–100)
GLUCOSE BLD STRIP.AUTO-MCNC: 444 MG/DL (ref 65–100)
GLUCOSE SERPL-MCNC: 492 MG/DL (ref 65–100)
HBA1C MFR BLD HPLC: NORMAL %
HCO3 BLDV-SCNC: 28.2 MMOL/L (ref 23–28)
HCT VFR BLD AUTO: 41.2 % (ref 35–47)
HGB BLD-MCNC: 13.8 G/DL (ref 11.5–16)
LYMPHOCYTES # BLD: 2.5 K/UL (ref 0.8–3.5)
LYMPHOCYTES NFR BLD: 34 % (ref 12–49)
MCH RBC QN AUTO: 27 PG (ref 26–34)
MCHC RBC AUTO-ENTMCNC: 33.5 G/DL (ref 30–36.5)
MCV RBC AUTO: 80.5 FL (ref 80–99)
MONOCYTES # BLD: 0.4 K/UL (ref 0–1)
MONOCYTES NFR BLD: 5 % (ref 5–13)
NEUTS SEG # BLD: 4.2 K/UL (ref 1.8–8)
NEUTS SEG NFR BLD: 58 % (ref 32–75)
O2/TOTAL GAS SETTING VFR VENT: 21 %
P-R INTERVAL, ECG05: 144 MS
PCO2 BLDV: 43.6 MMHG (ref 41–51)
PH BLDV: 7.42 [PH] (ref 7.32–7.42)
PLATELET # BLD AUTO: 286 K/UL (ref 150–400)
PMV BLD AUTO: 11.5 FL (ref 8.9–12.9)
PO2 BLDV: 47 MMHG (ref 25–40)
POTASSIUM SERPL-SCNC: 4.1 MMOL/L (ref 3.5–5.1)
PROT SERPL-MCNC: 6.7 G/DL (ref 6.4–8.2)
Q-T INTERVAL, ECG07: 372 MS
QRS DURATION, ECG06: 78 MS
QTC CALCULATION (BEZET), ECG08: 450 MS
RBC # BLD AUTO: 5.12 M/UL (ref 3.8–5.2)
SAMPLES BEING HELD,HOLD: NORMAL
SAO2 % BLDV: 83 % (ref 65–88)
SERVICE CMNT-IMP: ABNORMAL
SERVICE CMNT-IMP: ABNORMAL
SODIUM SERPL-SCNC: 134 MMOL/L (ref 136–145)
SPECIMEN TYPE: ABNORMAL
TOTAL RESP. RATE, ITRR: 19
TROPONIN I SERPL-MCNC: <0.05 NG/ML
VENTRICULAR RATE, ECG03: 88 BPM
WBC # BLD AUTO: 7.2 K/UL (ref 3.6–11)

## 2019-10-23 PROCEDURE — 84484 ASSAY OF TROPONIN QUANT: CPT

## 2019-10-23 PROCEDURE — 74011250636 HC RX REV CODE- 250/636: Performed by: PHYSICIAN ASSISTANT

## 2019-10-23 PROCEDURE — 80053 COMPREHEN METABOLIC PANEL: CPT

## 2019-10-23 PROCEDURE — 82962 GLUCOSE BLOOD TEST: CPT

## 2019-10-23 PROCEDURE — 36415 COLL VENOUS BLD VENIPUNCTURE: CPT

## 2019-10-23 PROCEDURE — 82803 BLOOD GASES ANY COMBINATION: CPT

## 2019-10-23 PROCEDURE — 70551 MRI BRAIN STEM W/O DYE: CPT

## 2019-10-23 PROCEDURE — 93005 ELECTROCARDIOGRAM TRACING: CPT

## 2019-10-23 PROCEDURE — 70450 CT HEAD/BRAIN W/O DYE: CPT

## 2019-10-23 PROCEDURE — 85025 COMPLETE CBC W/AUTO DIFF WBC: CPT

## 2019-10-23 PROCEDURE — 99285 EMERGENCY DEPT VISIT HI MDM: CPT

## 2019-10-23 RX ORDER — FLUOXETINE 10 MG/1
10 CAPSULE ORAL DAILY
Refills: 1 | COMMUNITY
Start: 2019-10-03

## 2019-10-23 RX ORDER — GLIMEPIRIDE 2 MG/1
2 TABLET ORAL
Qty: 60 TAB | Refills: 0 | Status: SHIPPED | OUTPATIENT
Start: 2019-10-23 | End: 2019-11-08 | Stop reason: SDUPTHER

## 2019-10-23 RX ADMIN — SODIUM CHLORIDE 1000 ML: 900 INJECTION, SOLUTION INTRAVENOUS at 16:40

## 2019-10-23 NOTE — ED TRIAGE NOTES
Pt ambulated to the treatment area accompanied by her daughter. Pt only speaks Ukrainian pts daughter states Levell Or has had trouble speaking for more than a week. Her med was changed by psychiatrist so I took her there in case it was related to that. I took her to her medical doctor they gave me a script for out pt CT. There was a problem with the insurance so I took her here I did not want to wait. Nataly GARCIA is in room speaking to daughter. Daughter denies any symptoms.  Pt is Allyson Isabel

## 2019-10-23 NOTE — PROGRESS NOTES
HISTORY OF PRESENT ILLNESS  Meeta Poon is a 63 y.o. female. HPI: Patient is accompanied by her daughter who speaks for the patient. She states that her mother is having difficulty to speak x 2 months. She has history of depression, uncontrolled diabetes, hyperlipidemia, and hypertension. Her current HA1C is 14.9. She has orders metformin , 2 tabs bid and insulin, but she in only taking her pills she doesn't like needles. Amaryl 2 mg bid added to metformin today. At this point I am not sure if she is taking her metformin either. She is followed by psychiatrist and had appointment with him today who sent her here to get head CT for aphasia. Denies dizziness, Denies loss of sensation and muscle strength   Past Medical History:   Diagnosis Date    [facility] ([facility]     HTN (hypertension)     Obesity (BMI 35.0-39.9 without comorbidity)     Psychotic disorder ([facility]    History reviewed. No pertinent surgical history. No Known Allergies    Current Outpatient Medications:     FLUoxetine (PROZAC) 10 mg capsule, TAKE 1 CAPSULE BY MOUTH EVERY DAY, Disp: , Rfl: 1    glimepiride (AMARYL) 2 mg tablet, Take 1 Tab by mouth every morning., Disp: 60 Tab, Rfl: 0    Blood-Glucose Meter monitoring kit, With needles and blood sugar strips # 100 each, Disp: 1 Kit, Rfl: 0    Insulin Needles, Disposable, 31 gauge x 5/16\" ndle, 25 needles, Disp: 1 Package, Rfl: 11    glucose blood VI test strips (ASCENSIA AUTODISC VI, ONE TOUCH ULTRA TEST VI) strip, Use as directed, Disp: 25 Strip, Rfl: 11    insulin glargine (LANTUS,BASAGLAR) 100 unit/mL (3 mL) inpn, 16 units daily, SQ, Disp: 3 Adjustable Dose Pre-filled Pen Syringe, Rfl: 3    Blood-Glucose Meter monitoring kit, With needles and test strips, Disp: 1 Kit, Rfl: 0    levothyroxine (SYNTHROID) 100 mcg tablet, Take 1 Tab by mouth Daily (before breakfast). , Disp: 30 Tab, Rfl: 5    raNITIdine (ZANTAC) 150 mg tablet, Take 1 Tab by mouth two (2) times a day., Disp: 60 Tab, Rfl: 5    nystatin (MYCOSTATIN) topical cream, Apply  to affected area two (2) times a day., Disp: 30 g, Rfl: 0    atorvastatin (LIPITOR) 20 mg tablet, Take 1 Tab by mouth daily. , Disp: 30 Tab, Rfl: 3    metFORMIN ER (GLUCOPHAGE XR) 500 mg tablet, Take 2 pills twice a day, with breakfast and dinner, Disp: 120 Tab, Rfl: 2    dicyclomine HCl (DICYCLOMINE PO), Take  by mouth two (2) times a day. Indications: Pt unsure of dosage, Disp: , Rfl:     insulin glargine (LANTUS,BASAGLAR) 100 unit/mL (3 mL) inpn, Give 16 unit sq daily Please give needles for the pen # 30, Disp: 3 Adjustable Dose Pre-filled Pen Syringe, Rfl: 3  Review of Systems   Constitutional: Negative. Respiratory: Negative. Cardiovascular: Negative. Gastrointestinal: Negative. Neurological: Negative for dizziness. Blood pressure 110/60, pulse 99, temperature 98.8 °F (37.1 °C), temperature source Oral, resp. rate 16, height 5' 3\" (1.6 m), weight 153 lb 6.4 oz (69.6 kg), SpO2 97 %. Body mass index is 27.17 kg/m². Physical Exam   Constitutional: She is oriented to person, place, and time. No distress. HENT:   Mouth/Throat: Oropharynx is clear and moist.   Neck: Normal range of motion. Neck supple. Cardiovascular: Normal rate and regular rhythm. No murmur heard. Pulmonary/Chest: Effort normal and breath sounds normal.   Abdominal: Soft. Bowel sounds are normal.   Musculoskeletal:   No muscle weakness   Neurological: She is alert and oriented to person, place, and time. She displays normal reflexes. No cranial nerve deficit. She exhibits normal muscle tone. Coordination normal.   Normal balance    Nursing note and vitals reviewed. ASSESSMENT and PLAN  Diagnoses and all orders for this visit:    1. Expressive aphasia  -     CT HEAD W CONT; Future will have it at 4 :30 PM today    2.  Type 2 diabetes mellitus with hyperglycemia, without long-term current use of insulin (HCC)  -     AMB POC HEMOGLOBIN A1C, unable to get the reading due to high reading, HA1C on 8/9,19 14.9  -     Add glimepiride (AMARYL) 2 mg tablet;  Take 1 Tab by mouth every morning.  -     REFERRAL TO ENDOCRINOLOGY  Pt was given an after visit summary which includes diagnosis, current medicines and vital and voiced understanding of treatment plan

## 2019-10-23 NOTE — ED NOTES
Daughter at bedside. Pt is non-english speaking, but daughter stated she has been translating. Per daughter pt denies pain. Discharge note: The patient was discharged home in stable condition, accompanied by daughter. The patient is alert and oriented, is in no respiratory distress. The patient's diagnosis, condition and treatment were explained to patient by Elizabeth Vivas. The per translation by daughter, patient expressed understanding of discharge instructionsand plan of care. A discharge plan has been developed. A  was not involved in the process. Patient offered a wheelchair to ED lobby for discharge but declined at this time. Patient ambulated with a steady gate to ED lobby to go home with daughter.

## 2019-10-23 NOTE — PROGRESS NOTES
Chief Complaint   Patient presents with    Depression     1. Have you been to the ER, urgent care clinic since your last visit? Hospitalized since your last visit? No    2. Have you seen or consulted any other health care providers outside of the [address] [place] [address] since your last visit? Include any pap smears or colon screening.  Yes Dr. Dana Cornejo [id number]

## 2019-10-23 NOTE — ED NOTES
Bedside shift change report given to ALAN Greenwood RN (oncoming nurse) by Johana Cox. January Mcneill RN (offgoing nurse). Report included the following information SBAR.

## 2019-10-23 NOTE — ED PROVIDER NOTES
62 y/o female with PMHx of DM, HTN, HLD, obesity and psychotic disorder, presenting with complaint of aphasia. The patient's daughter states that for the past week she has had difficulty speaking. She is usually able to speak fluently in full sentences, but over the past week has had difficulty speaking more than a couple words at a time and has seemed to have a hard time getting her words out. No slurred speech. Her daughter states that her psychiatrist recently made a change in her medications so they made an appointment with him this morning. Her psychiatrist advised that her symptoms could possibly be due to the medication change but recommended medical evaluation to rule out other causes. They saw her PCP this afternoon who ordered an outpatient head CT, however patient's daughter states that there was a problem with insurance authorization, prompting their visit to the ED today. The patient reports intermittent blurry vision but denies weakness, numbness, nausea, vomiting, headache or syncope. The history is provided by the patient and a relative. The history is limited by a language barrier. A  was used (daughter). Past Medical History:   Diagnosis Date    Diabetes ([address]     HTN (hypertension)     Obesity (BMI 35.0-39.9 without comorbidity)     Psychotic disorder (HCC)        No past surgical history on file.       Family History:   Problem Relation Age of Onset    Diabetes Mother     Heart Disease Mother        Social History     Socioeconomic History    Marital status:      Spouse name: Not on file    Number of children: Not on file    Years of education: Not on file    Highest education level: Not on file   Occupational History    Not on file   Social Needs    Financial resource strain: Not on file    Food insecurity:     Worry: Not on file     Inability: Not on file    Transportation needs:     Medical: Not on file     Non-medical: Not on file Tobacco Use    Smoking status: Never Smoker    Smokeless tobacco: Never Used   Substance and Sexual Activity    Alcohol use: Not Currently    Drug use: Not Currently    Sexual activity: Not Currently   Lifestyle    Physical activity:     Days per week: Not on file     Minutes per session: Not on file    Stress: Not on file   Relationships    Social connections:     Talks on phone: Not on file     Gets together: Not on file     Attends Alevism service: Not on file     Active member of club or organization: Not on file     Attends meetings of clubs or organizations: Not on file     Relationship status: Not on file    Intimate partner violence:     Fear of current or ex partner: Not on file     Emotionally abused: Not on file     Physically abused: Not on file     Forced sexual activity: Not on file   Other Topics Concern    Not on file   Social History Narrative    Not on file         ALLERGIES: Patient has no known allergies. Review of Systems   Constitutional: Negative for chills and fever. Eyes: Positive for visual disturbance (intermittent blurry vision). Respiratory: Negative for shortness of breath. Gastrointestinal: Negative for abdominal pain, nausea and vomiting. Musculoskeletal: Negative for myalgias. Neurological: Positive for speech difficulty. Negative for syncope, weakness and numbness. All other systems reviewed and are negative. Vitals:    10/23/19 1612 10/23/19 1615 10/23/19 1619   BP: 130/67 116/64    Pulse: 93 93 87   Resp: 20 21 20   Temp: 98 °F (36.7 °C)     SpO2: 97% 97% 97%   Weight: 69.6 kg (153 lb 6.4 oz)     Height: 5' 3\" (1.6 m)              Physical Exam   Constitutional: She is oriented to person, place, and time. She appears well-developed and well-nourished. No distress. HENT:   Head: Normocephalic and atraumatic. Eyes: Pupils are equal, round, and reactive to light. Conjunctivae and EOM are normal.   Neck: Normal range of motion. Neck supple. Cardiovascular: Normal rate, regular rhythm and normal heart sounds. Pulmonary/Chest: Effort normal and breath sounds normal.   Abdominal: Soft. She exhibits no distension. There is no tenderness. There is no guarding. Neurological: She is alert and oriented to person, place, and time. CN 2-12 tested and intact. 5/5 strength of bilateral upper and lower extremities with intact light touch sensation. Patient speaking in Slovenian to daughter, speech is slow but able to speak in small sentences. Skin: Skin is warm and dry. She is not diaphoretic. Nursing note and vitals reviewed. MDM  Number of Diagnoses or Management Options  Difficulty with speech:      Amount and/or Complexity of Data Reviewed  Clinical lab tests: ordered and reviewed  Tests in the radiology section of CPT®: ordered and reviewed  Discuss the patient with other providers: yes (Dr. Kyra Veliz, ED attending)    Patient Progress  Patient progress: stable         Procedures  ED EKG interpretation:  Rhythm: normal sinus rhythm; and regular . Rate (approx.): 88; Axis: normal; ST/T wave: normal.  Interpreted by Dr. Kyra Veliz, 4:35 PM         62 y/o female with PMHx of DM, HTN, HLD, obesity and psychotic disorder, presenting with complaint of aphasia. History and exam suggest likely psychiatric etiology of speech difficulty. CT head and MRI brain obtained, without evidence of CVA or other acute intracranial pathology. Glucose 444 but no evidence of DKA - pH 7.41, bicarb 25, anion gap 8. Glucose improved to 290 after 1L fluid bolus. We discussed the importance of medication compliance and prompt PCP follow up for management of DM, as well as psych follow up for speech difficulty. Strict ED return precautions discussed and provided in writing at time of discharge. The patient and her daughter verbalized understanding and agreement with this plan.

## 2019-10-25 ENCOUNTER — TELEPHONE (OUTPATIENT)
Dept: FAMILY MEDICINE CLINIC | Age: 59
End: 2019-10-25

## 2019-10-25 NOTE — TELEPHONE ENCOUNTER
----- Message from Jayne Loo sent at 10/25/2019  4:41 PM EDT -----  Regarding: JAYME Faye/ Telephone   General Message/Vendor Calls    Caller's first and last name:  Lila Barba    Reason for call:  Prior authorization on a CT of the brain    Callback required yes/no and why:  No call back number     Best contact number(s):  Fax number- [phone]    Details to clarify the request:  Requesting clinical information to be sent in to complete the review on the request for the brain CT.     Did not have 3rd party verification   Jayne Loo

## 2019-11-08 ENCOUNTER — OFFICE VISIT (OUTPATIENT)
Dept: FAMILY MEDICINE CLINIC | Age: 59
End: 2019-11-08

## 2019-11-08 VITALS
HEIGHT: 63 IN | BODY MASS INDEX: 26.65 KG/M2 | DIASTOLIC BLOOD PRESSURE: 70 MMHG | OXYGEN SATURATION: 98 % | HEART RATE: 93 BPM | SYSTOLIC BLOOD PRESSURE: 102 MMHG | WEIGHT: 150.4 LBS | RESPIRATION RATE: 16 BRPM | TEMPERATURE: 99.1 F

## 2019-11-08 DIAGNOSIS — Z23 ENCOUNTER FOR IMMUNIZATION: ICD-10-CM

## 2019-11-08 DIAGNOSIS — E11.65 TYPE 2 DIABETES MELLITUS WITH HYPERGLYCEMIA, WITHOUT LONG-TERM CURRENT USE OF INSULIN (HCC): Primary | ICD-10-CM

## 2019-11-08 RX ORDER — GLIMEPIRIDE 2 MG/1
2 TABLET ORAL
Qty: 60 TAB | Refills: 0 | Status: SHIPPED | OUTPATIENT
Start: 2019-11-08 | End: 2019-11-13 | Stop reason: SDUPTHER

## 2019-11-08 RX ORDER — METFORMIN HYDROCHLORIDE 500 MG/1
TABLET, EXTENDED RELEASE ORAL
Qty: 120 TAB | Refills: 2 | Status: SHIPPED | OUTPATIENT
Start: 2019-11-08 | End: 2019-11-13 | Stop reason: SDUPTHER

## 2019-11-08 NOTE — PROGRESS NOTES
Chief Complaint   Patient presents with    Diabetes     f/u     1. Have you been to the ER, urgent care clinic since your last visit? Hospitalized since your last visit? No    2. Have you seen or consulted any other health care providers outside of the [address] [place] [address] since your last visit? Include any pap smears or colon screening. No    Ul. Talha Hooper is a 63 y.o. female  who presents for routine immunizations. she denies any symptoms , reactions or allergies that would exclude them from being immunized today. Risks and adverse reactions were discussed and the VIS was given to them. All questions were addressed. she was observed for 10 min post injection. There were no reactions observed.     Lucero Dumont LPN

## 2019-11-08 NOTE — PATIENT INSTRUCTIONS
Vaccine Information Statement    Influenza (Flu) Vaccine (Inactivated or Recombinant): What You Need to Know    Many Vaccine Information Statements are available in Romansh and other languages. See www.immunize.org/vis  Hojas de información sobre vacunas están disponibles en español y en muchos otros idiomas. Visite www.immunize.org/vis    1. Why get vaccinated? Influenza vaccine can prevent influenza (flu). Flu is a contagious disease that spreads around the [facility] every year, usually between October and May. Anyone can get the flu, but it is more dangerous for some people. Infants and young children, people 72years of age and older, pregnant women, and people with certain health conditions or a weakened immune system are at greatest risk of flu complications. Pneumonia, bronchitis, sinus infections and ear infections are examples of flu-related complications. If you have a medical condition, such as heart disease, cancer or diabetes, flu can make it worse. Flu can cause fever and chills, sore throat, muscle aches, fatigue, cough, headache, and runny or stuffy nose. Some people may have vomiting and diarrhea, though this is more common in children than adults. Each year thousands of people in the [facility] die from flu, and many more are hospitalized. Flu vaccine prevents millions of illnesses and flu-related visits to the doctor each year. 2. Influenza vaccines     CDC recommends everyone 10months of age and older get vaccinated every flu season. Children 6 months through 6years of age may need 2 doses during a single flu season. Everyone else needs only 1 dose each flu season. It takes about 2 weeks for protection to develop after vaccination. There are many flu viruses, and they are always changing. Each year a new flu vaccine is made to protect against three or four viruses that are likely to cause disease in the upcoming flu season.  Even when the vaccine doesnt exactly match these viruses, it may still provide some protection. Influenza vaccine does not cause flu. Influenza vaccine may be given at the same time as other vaccines. 3. Talk with your health care provider    Tell your vaccine provider if the person getting the vaccine:   Has had an allergic reaction after a previous dose of influenza vaccine, or has any severe, life-threatening allergies.  Has ever had Guillain-Barré Syndrome (also called GBS). In some cases, your health care provider may decide to postpone influenza vaccination to a future visit. People with minor illnesses, such as a cold, may be vaccinated. People who are moderately or severely ill should usually wait until they recover before getting influenza vaccine. Your health care provider can give you more information. 4. Risks of a reaction     Soreness, redness, and swelling where shot is given, fever, muscle aches, and headache can happen after influenza vaccine.  There may be a very small increased risk of Guillain-Barré Syndrome (GBS) after inactivated influenza vaccine (the flu shot). [facility] children who get the flu shot along with pneumococcal vaccine (PCV13), and/or DTaP vaccine at the same time might be slightly more likely to have a seizure caused by fever. Tell your health care provider if a child who is getting flu vaccine has ever had a seizure. People sometimes faint after medical procedures, including vaccination. Tell your provider if you feel dizzy or have vision changes or ringing in the ears. As with any medicine, there is a very remote chance of a vaccine causing a severe allergic reaction, other serious injury, or death. 5. What if there is a serious problem? An allergic reaction could occur after the vaccinated person leaves the clinic.  If you see signs of a severe allergic reaction (hives, swelling of the face and throat, difficulty breathing, a fast heartbeat, dizziness, or weakness), call 9-1-1 and get the person to the nearest hospital.    For other signs that concern you, call your health care provider. Adverse reactions should be reported to the Vaccine Adverse Event Reporting System (VAERS). Your health care provider will usually file this report, or you can do it yourself. Visit the VAERS website at www.vaers. [facility].gov or call 3-818.873.3932. VAERS is only for reporting reactions, and VAERS staff do not give medical advice. 6. The National Vaccine Injury Compensation Program    The [facility] Vaccine Injury Compensation Program (VICP) is a federal program that was created to compensate people who may have been injured by certain vaccines. Visit the VICP website at www.[facility].gov/vaccinecompensation or call 4-589.640.8342 to learn about the program and about filing a claim. There is a time limit to file a claim for compensation. 7. How can I learn more?  Ask your health care provider.  Call your local or state health department.  Contact the Centers for Disease Control and Prevention (CDC):  - Call 2-558.171.1560 (1-800-CDC-INFO) or  - Visit CDCs influenza website at www.cdc.gov/flu    Vaccine Information Statement (Interim)  Inactivated Influenza Vaccine   8/15/2019  [id number]   Department of Health and Human Services  Centers for Disease Control and Prevention    Office Use Only

## 2019-11-08 NOTE — PROGRESS NOTES
HISTORY OF PRESENT ILLNESS  Funmilayo Pierson is a 63 y.o. female. HPI: Following up on diabetes,  states that she has been taking metformin and Amaryl bid. And he r blood sugars are better. Her speech has also improved. Taking the medication. she started eating breakfast.   Due for flu vaccine     Past Medical History:   Diagnosis Date    [facility] ([facility]     HTN (hypertension)     Obesity (BMI 35.0-39.9 without comorbidity)     Psychiatric disorder     PTSD    [facility]      History reviewed. No pertinent surgical history. No Known Allergies    Current Outpatient Medications:     metFORMIN ER (GLUCOPHAGE XR) 500 mg tablet, Take 2 pills twice a day, with breakfast and dinner, Disp: 120 Tab, Rfl: 2    glimepiride (AMARYL) 2 mg tablet, Take 1 Tab by mouth every morning., Disp: 60 Tab, Rfl: 0    FLUoxetine (PROZAC) 10 mg capsule, TAKE 1 CAPSULE BY MOUTH EVERY DAY, Disp: , Rfl: 1    Insulin Needles, Disposable, 31 gauge x 5/16\" ndle, 25 needles, Disp: 1 Package, Rfl: 11    insulin glargine (LANTUS,BASAGLAR) 100 unit/mL (3 mL) inpn, 16 units daily, SQ, Disp: 3 Adjustable Dose Pre-filled Pen Syringe, Rfl: 3    insulin glargine (LANTUS,BASAGLAR) 100 unit/mL (3 mL) inpn, Give 16 unit sq daily Please give needles for the pen # 30, Disp: 3 Adjustable Dose Pre-filled Pen Syringe, Rfl: 3    levothyroxine (SYNTHROID) 100 mcg tablet, Take 1 Tab by mouth Daily (before breakfast). , Disp: 30 Tab, Rfl: 5    raNITIdine (ZANTAC) 150 mg tablet, Take 1 Tab by mouth two (2) times a day., Disp: 60 Tab, Rfl: 5    nystatin (MYCOSTATIN) topical cream, Apply  to affected area two (2) times a day., Disp: 30 g, Rfl: 0    atorvastatin (LIPITOR) 20 mg tablet, Take 1 Tab by mouth daily. , Disp: 30 Tab, Rfl: 3    dicyclomine HCl (DICYCLOMINE PO), Take  by mouth two (2) times a day.  Indications: Pt unsure of dosage, Disp: , Rfl:     Blood-Glucose Meter monitoring kit, With needles and blood sugar strips # 100 each, Disp: 1 Kit, Rfl: 0    glucose blood VI test strips (ASCENSIA AUTODISC VI, ONE TOUCH ULTRA TEST VI) strip, Use as directed, Disp: 25 Strip, Rfl: 11    Blood-Glucose Meter monitoring kit, With needles and test strips, Disp: 1 Kit, Rfl: 0    Review of Systems   Constitutional: Negative. HENT: Negative. Respiratory: Negative. Cardiovascular: Negative. Gastrointestinal: Negative. Blood pressure 102/70, pulse 93, temperature 99.1 °F (37.3 °C), temperature source Oral, resp. rate 16, height 5' 3\" (1.6 m), weight 150 lb 6.4 oz (68.2 kg), SpO2 98 %. Physical Exam   Constitutional: No distress. HENT:   Mouth/Throat: Oropharynx is clear and moist.   Neck: Normal range of motion. Neck supple. Cardiovascular: Normal rate and regular rhythm. No murmur heard. Pulmonary/Chest: Effort normal and breath sounds normal.   Abdominal: Soft. Bowel sounds are normal.   Nursing note and vitals reviewed. ASSESSMENT and PLAN  Diagnoses and all orders for this visit:    1. Type 2 diabetes mellitus with hyperglycemia, without long-term current use of insulin (HCC)  -     metFORMIN ER (GLUCOPHAGE XR) 500 mg tablet; Take 2 pills twice a day, with breakfast and dinner  -     glimepiride (AMARYL) 2 mg tablet; Take 1 Tab by mouth every morning.     2. Encounter for immunization  -     INFLUENZA VIRUS VAC QUAD,SPLIT,PRESV FREE SYRINGE IM  -     DE IMMUNIZ ADMIN,1 SINGLE/COMB VAC/TOXOID

## 2019-11-13 ENCOUNTER — TELEPHONE (OUTPATIENT)
Dept: FAMILY MEDICINE CLINIC | Age: 59
End: 2019-11-13

## 2019-11-13 DIAGNOSIS — E11.65 TYPE 2 DIABETES MELLITUS WITH HYPERGLYCEMIA, WITHOUT LONG-TERM CURRENT USE OF INSULIN (HCC): ICD-10-CM

## 2019-11-13 RX ORDER — METFORMIN HYDROCHLORIDE 500 MG/1
TABLET, EXTENDED RELEASE ORAL
Qty: 120 TAB | Refills: 2 | Status: SHIPPED | OUTPATIENT
Start: 2019-11-13

## 2019-11-13 RX ORDER — GLIMEPIRIDE 2 MG/1
2 TABLET ORAL
Qty: 60 TAB | Refills: 2 | Status: SHIPPED | OUTPATIENT
Start: 2019-11-13

## 2019-11-13 RX ORDER — METFORMIN HYDROCHLORIDE 500 MG/1
TABLET, EXTENDED RELEASE ORAL
Qty: 120 TAB | Refills: 5 | Status: CANCELLED | OUTPATIENT
Start: 2019-11-13

## 2019-11-13 NOTE — TELEPHONE ENCOUNTER
----- Message from Lauren Bring sent at 11/13/2019  2:32 PM EST -----  Regarding: JAYME Azar/ Telephone   Contact: [phone]  Caller's first and last name: Skye Arevalo, daughter  Reason for call: she stated that the pt is completely out of all medications. Also pt had a CT scan done and  the insurance did not cover due to not enough information for the reasoning of the CT SCAN and MRI.    Callback required yes/no and why: Yes  Best contact number(s): [phone]  Details to clarify the request: N/A

## 2019-11-16 ENCOUNTER — HOSPITAL ENCOUNTER (EMERGENCY)
Age: 59
Discharge: HOME | End: 2019-11-17
Attending: EMERGENCY MEDICINE
Payer: COMMERCIAL

## 2019-11-16 ENCOUNTER — HOSPITAL ENCOUNTER (EMERGENCY)
Dept: GENERAL RADIOLOGY | Age: 59
Discharge: HOME | End: 2019-11-16
Attending: EMERGENCY MEDICINE
Payer: COMMERCIAL

## 2019-11-16 ENCOUNTER — APPOINTMENT (OUTPATIENT)
Dept: CT IMAGING | Age: 59
End: 2019-11-16
Attending: EMERGENCY MEDICINE
Payer: COMMERCIAL
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
## 2019-11-16 DIAGNOSIS — S32.020A CLOSED COMPRESSION FRACTURE OF SECOND LUMBAR VERTEBRA, INITIAL ENCOUNTER: ICD-10-CM

## 2019-11-16 PROCEDURE — 72100 X-RAY EXAM L-S SPINE 2/3 VWS: CPT

## 2019-11-16 PROCEDURE — 72131 CT LUMBAR SPINE W/O DYE: CPT

## 2019-11-16 PROCEDURE — 73502 X-RAY EXAM HIP UNI 2-3 VIEWS: CPT

## 2019-11-16 PROCEDURE — 74011250637 HC RX REV CODE- 250/637: Performed by: EMERGENCY MEDICINE

## 2019-11-16 PROCEDURE — 99283 EMERGENCY DEPT VISIT LOW MDM: CPT

## 2019-11-16 RX ORDER — HYDROCODONE BITARTRATE AND ACETAMINOPHEN 5; 325 MG/1; MG/1
1 TABLET ORAL
Qty: 20 TAB | Refills: 0 | Status: SHIPPED | OUTPATIENT
Start: 2019-11-16 | End: 2019-11-21

## 2019-11-16 RX ORDER — ARM BRACE
EACH MISCELLANEOUS
Qty: 1 DEVICE | Refills: 0 | Status: SHIPPED | OUTPATIENT
Start: 2019-11-16 | End: 2019-11-18

## 2019-11-16 RX ORDER — HYDROCODONE BITARTRATE AND ACETAMINOPHEN 5; 325 MG/1; MG/1
1 TABLET ORAL
Status: COMPLETED | OUTPATIENT
Start: 2019-11-16 | End: 2019-11-17

## 2019-11-16 RX ORDER — AMOXICILLIN 250 MG
2 CAPSULE ORAL DAILY
Qty: 60 TAB | Refills: 0 | Status: SHIPPED | OUTPATIENT
Start: 2019-11-16 | End: 2019-12-16

## 2019-11-16 RX ORDER — ACETAMINOPHEN 325 MG/1
650 TABLET ORAL
Status: COMPLETED | OUTPATIENT
Start: 2019-11-16 | End: 2019-11-16

## 2019-11-16 RX ADMIN — ACETAMINOPHEN 650 MG: 325 TABLET ORAL at 21:30

## 2019-11-17 VITALS
SYSTOLIC BLOOD PRESSURE: 134 MMHG | DIASTOLIC BLOOD PRESSURE: 65 MMHG | WEIGHT: 153 LBS | OXYGEN SATURATION: 98 % | TEMPERATURE: 98.1 F | HEART RATE: 98 BPM | RESPIRATION RATE: 20 BRPM | BODY MASS INDEX: 27.1 KG/M2

## 2019-11-17 PROCEDURE — 74011250637 HC RX REV CODE- 250/637: Performed by: EMERGENCY MEDICINE

## 2019-11-17 RX ADMIN — HYDROCODONE BITARTRATE AND ACETAMINOPHEN 1 TABLET: 5; 325 TABLET ORAL at 00:01

## 2019-11-17 NOTE — ED PROVIDER NOTES
This is a 73-year-old female comes emergency room with chief complaint of lumbar back pain. Through interpretation of the daughter, patient and daughter states that the patient fell 2 days ago. Patient states that she was walking and lost her balance and fell backwards. Patient has been complaining of lumbar back pain since that time. Patient states is continued to worsen. Patient denies any numbness or tingling in the lower extremities. Patient denies any loss of consciousness or any head injury. The history is provided by the patient and a relative. A  was used (Daughter). Back Pain    This is a new problem. The current episode started 2 days ago. The problem has been gradually worsening. The problem occurs constantly. The pain is associated with a fall. The pain is present in the lumbar spine and left side. The quality of the pain is described as sharp. The pain does not radiate. The pain is moderate. The symptoms are aggravated by certain positions. Pertinent negatives include no chest pain, no fever, no numbness, no abdominal pain, no abdominal swelling, no bowel incontinence, no bladder incontinence, no dysuria, no leg pain, no paresthesias, no paresis, no tingling and no weakness. Treatments tried: Over-the-counter medicine. The patient's surgical history non-contributory        Past Medical History:   Diagnosis Date    Diabetes ([facility]     HTN (hypertension)     Obesity (BMI 35.0-39.9 without comorbidity)     Psychiatric disorder     PTSD    Psychotic disorder ([facility]        History reviewed. No pertinent surgical history.       Family History:   Problem Relation Age of Onset    Diabetes Mother     Heart Disease Mother        Social History     Socioeconomic History    Marital status:      Spouse name: Not on file    Number of children: Not on file    Years of education: Not on file    Highest education level: Not on file   Occupational History    Not on file Social Needs    Financial resource strain: Not on file    Food insecurity:     Worry: Not on file     Inability: Not on file    Transportation needs:     Medical: Not on file     Non-medical: Not on file   Tobacco Use    Smoking status: Never Smoker    Smokeless tobacco: Never Used   Substance and Sexual Activity    Alcohol use: Not Currently    Drug use: Not Currently    Sexual activity: Not Currently   Lifestyle    Physical activity:     Days per week: Not on file     Minutes per session: Not on file    Stress: Not on file   Relationships    Social connections:     Talks on phone: Not on file     Gets together: Not on file     Attends Faith service: Not on file     Active member of club or organization: Not on file     Attends meetings of clubs or organizations: Not on file     Relationship status: Not on file    Intimate partner violence:     Fear of current or ex partner: Not on file     Emotionally abused: Not on file     Physically abused: Not on file     Forced sexual activity: Not on file   Other Topics Concern    Not on file   Social History Narrative    Not on file     ALLERGIES: Patient has no known allergies. Review of Systems   Constitutional: Negative for appetite change, chills, fever and unexpected weight change. HENT: Negative for ear pain, hearing loss, rhinorrhea and trouble swallowing. Eyes: Negative for pain and visual disturbance. Respiratory: Negative for cough, chest tightness and shortness of breath. Cardiovascular: Negative for chest pain and palpitations. Gastrointestinal: Negative for abdominal distention, abdominal pain, blood in stool, bowel incontinence and vomiting. Genitourinary: Negative for bladder incontinence, dysuria, hematuria and urgency. Musculoskeletal: Positive for back pain. Negative for myalgias. Skin: Negative for rash. Neurological: Negative for dizziness, tingling, syncope, weakness, numbness and paresthesias. Psychiatric/Behavioral: Negative for confusion and suicidal ideas. All other systems reviewed and are negative. Vitals:    11/16/19 2120 11/17/19 0007   BP: 139/82 134/65   Pulse: (!) 117 98   Resp: (!) 36 20   Temp: 98.1 °F (36.7 °C)    SpO2: 99% 98%   Weight: 69.4 kg (153 lb)             Physical Exam   Constitutional: She is oriented to person, place, and time. She appears well-developed and well-nourished. No distress. HENT:   Head: Normocephalic and atraumatic. Right Ear: External ear normal.   Left Ear: External ear normal.   Nose: Nose normal.   Mouth/Throat: Oropharynx is clear and moist. No oropharyngeal exudate. Eyes: Pupils are equal, round, and reactive to light. Conjunctivae and EOM are normal. Right eye exhibits no discharge. Left eye exhibits no discharge. No scleral icterus. Neck: Normal range of motion. Neck supple. No JVD present. No tracheal deviation present. Cardiovascular: Normal rate, regular rhythm, normal heart sounds and intact distal pulses. Exam reveals no gallop and no friction rub. No murmur heard. Pulmonary/Chest: Effort normal and breath sounds normal. No stridor. No respiratory distress. She has no decreased breath sounds. She has no wheezes. She has no rhonchi. She has no rales. She exhibits no tenderness. Abdominal: Soft. Bowel sounds are normal. She exhibits no distension. There is no tenderness. There is no rebound and no guarding. Musculoskeletal: Normal range of motion. She exhibits no edema. Lumbar back: She exhibits tenderness. Back:    Bilateral lower extremities are neurovascular intact. There is no motor or sensory deficits noted. Pulses are 2+ and equal of the lower extremities. Neurological: She is alert and oriented to person, place, and time. She has normal strength and normal reflexes. She displays normal reflexes. No cranial nerve deficit or sensory deficit. She exhibits normal muscle tone.  Coordination normal. GCS eye subscore is 4. GCS verbal subscore is 5. GCS motor subscore is 6. Skin: Skin is warm and dry. Capillary refill takes less than 2 seconds. No rash noted. She is not diaphoretic. No erythema. No pallor. Psychiatric: She has a normal mood and affect. Her behavior is normal. Judgment and thought content normal.   Nursing note and vitals reviewed. MDM  Number of Diagnoses or Management Options  Closed compression fracture of second lumbar vertebra, initial encounter [facility]):   Fall, initial encounter:      Amount and/or Complexity of Data Reviewed  Tests in the radiology section of CPT®: ordered and reviewed  Discuss the patient with other providers: yes (Ortho)    Risk of Complications, Morbidity, and/or Mortality  Presenting problems: moderate  Diagnostic procedures: moderate  Management options: moderate    Patient Progress  Patient progress: stable         Procedures    Chief Complaint   Patient presents with   Jimenes Fall       The patient's presenting problems have been discussed, and they are in agreement with the care plan formulated and outlined with them. I have encouraged them to ask questions as they arise throughout their visit. MEDICATIONS GIVEN:  Medications   acetaminophen (TYLENOL) tablet 650 mg (650 mg Oral Given 11/16/19 2130)   HYDROcodone-acetaminophen (NORCO) 5-325 mg per tablet 1 Tab (1 Tab Oral Given 11/17/19 0001)       LABS REVIEWED:  No results found for this or any previous visit (from the past 24 hour(s)). VITAL SIGNS:  Patient Vitals for the past 24 hrs:   Temp Pulse Resp BP SpO2   11/17/19 0007  98 20 134/65 98 %   11/16/19 2120 98.1 °F (36.7 °C) (!) 117 (!) 36 139/82 99 %       RADIOLOGY RESULTS:  The following have been ordered and reviewed:  Xr Spine Lumb 2 Or 3 V    Result Date: 11/16/2019  EXAM: XR SPINE LUMB 2 OR 3 V INDICATION: Low back pain after fall TECHNIQUE: AP, lateral, and coned-down lateral views of the lumbar spine. COMPARISON: None.  FINDINGS: Compression fracture of the L2 vertebral body with approximately 25% loss of height. Ill-defined superior endplate fracture. No other fracture. Mild osteopenia. Grade 2 anterolisthesis of L5 on S1 measures 12 mm. Bilateral L5 spondylolysis. Moderate facet arthrosis L4-S1. Osteopenia. IMPRESSION: 1. Age-indeterminate L2 partial compression fracture may be acute. 2. L5-S1 grade 2 anterolisthesis due to L5 spondylolysis and facet arthrosis. Xr Hip Lt W Or Wo Pelv 2-3 Vws    Result Date: 11/16/2019  EXAM: XR HIP LT W OR WO PELV 2-3 VWS INDICATION: Left hip pain after ground-level fall. COMPARISON: None. FINDINGS: An AP view of the pelvis and a frogleg lateral view of the left hip demonstrate no fracture, dislocation or other acute abnormality. Bilateral greater trochanteric enthesophytes. Bones are osteopenic. Radiodense foreign body in projection with the right iliac bone may be extracorporeal. Lumbar spine degenerative disc disease is partially imaged. Sacrum is not well evaluated due to bowel gas. IMPRESSION: No acute abnormality. Ct Spine Lumb Wo Cont    Result Date: 11/16/2019  EXAM:  CT LUMBAR SPINE WITHOUT  CONTRAST INDICATION: Low back pain, L2 fracture. COMPARISON: Lumbar spine views earlier this evening. CONTRAST: None. TECHNIQUE: Multislice helical CT of the lumbar spine was performed without intravenous contrast administration. Coronal and sagittal reconstructions were generated. CT dose reduction was achieved through use of a standardized protocol tailored for this examination and automatic exposure control for dose modulation. Adaptive statistical iterative reconstruction (ASIR) was utilized. FINDINGS: 9 mm grade 1 anterolisthesis of L5 on S1 in the supine position. Bilateral L5 spondylolysis. Comminuted compression fracture of the L2 vertebral body is acute or subacute. No bony retropulsion. Approximately 25% loss of height of the L2 vertebral body. No other fracture. No evidence of bone lesion.  The paravertebral soft tissues are within normal limits. Lower thoracic spine: No herniation or stenosis. L1-L2: No herniation or stenosis. L2-L3: No herniation or stenosis. L3-L4: Diffuse disc bulge. No stenosis. L4-L5: Diffuse disc bulge. No stenosis. L5-S1: Uncovered disc. Moderate bilateral foraminal stenosis. IMPRESSION: 1. Acute versus subacute L2 partial compression fracture. No bony retropulsion. Patient is a likely candidate for kyphoplasty. 2. L5-S1 grade 1 anterolisthesis due to bilateral L5 spondylolysis. Moderate bilateral foraminal stenosis at L5-S1.    CONSULTATIONS:   CONSULT NOTE:  11:31 PM Felecia Nj DO spoke with Dr. Javier Woods, and Katelyn Forrest NP Consult for Orthopedics. Discussed available diagnostic tests and clinical findings. They are in agreement with care plans as outlined. Recommend patient be placed in a TLSO brace and follow-up as outpatient. PROGRESS NOTES:  Discussed results and plan with patient and daughter. Patient will be discharged home with PCP and Ortho follow up. Patient instructed to return to the emergency room for any worsening symptoms or any other concerns. DIAGNOSIS:    1. Fall, initial encounter    2. Closed compression fracture of second lumbar vertebra, initial encounter [facility])        PLAN:  Follow-up Information     Follow up With Specialties Details Why Contact Info Additional Information    Sergio Flores MD Orthopedic Surgery Schedule an appointment as soon as possible for a visit  [address] [facility] [address]  [phone]       Albin Pool NP Family Practice Schedule an appointment as soon as possible for a visit  [address] [facility] [address] [id number] [facility] Radiology Call in 2 days for possible kyphoplasty [address] [facility]  [phone] [place] in designated visitor/patient parking.   Enter through the main entrance, which is just to the left of the bonnie. Once inside, go around the corner to the left. You will register in Outpatient Registration. [facility] EMERGENCY DEPT Emergency Medicine  If symptoms worsen [place]itra Cornejo [id number] [phone]         Current Discharge Medication List      START taking these medications    Details   HYDROcodone-acetaminophen (NORCO) 5-325 mg per tablet Take 1 Tab by mouth every six (6) hours as needed for Pain for up to 5 days. Max Daily Amount: 4 Tabs. Qty: 20 Tab, Refills: 0    Associated Diagnoses: Closed compression fracture of second lumbar vertebra, initial encounter ([facility] [address]      senna-docusate (SENNA-S) 8.6-50 mg per tablet Take 2 Tabs by mouth daily for 30 days. Qty: 60 Tab, Refills: 0      Back Brace misc Use TLSO back brace as directed. Qty: 1 Device, Refills: 0    Associated Diagnoses: Closed compression fracture of second lumbar vertebra, initial encounter ([facility]         CONTINUE these medications which have NOT CHANGED    Details   metFORMIN ER (GLUCOPHAGE XR) 500 mg tablet Take 2 pills twice a day, with breakfast and dinner  Qty: 120 Tab, Refills: 2    Associated Diagnoses: Type 2 diabetes mellitus with hyperglycemia, without long-term current use of insulin ([facility])      glimepiride (AMARYL) 2 mg tablet Take 1 Tab by mouth every morning. Qty: 60 Tab, Refills: 2    Associated Diagnoses: Type 2 diabetes mellitus with hyperglycemia, without long-term current use of insulin ([facility])      FLUoxetine (PROZAC) 10 mg capsule TAKE 1 CAPSULE BY MOUTH EVERY DAY  Refills: 1      glucose blood VI test strips (ASCENSIA AUTODISC VI, ONE TOUCH ULTRA TEST VI) strip Use as directed  Qty: 25 Strip, Refills: 11      levothyroxine (SYNTHROID) 100 mcg tablet Take 1 Tab by mouth Daily (before breakfast). Qty: 30 Tab, Refills: 5      raNITIdine (ZANTAC) 150 mg tablet Take 1 Tab by mouth two (2) times a day.   Qty: 60 Tab, Refills: 5      atorvastatin (LIPITOR) 20 mg tablet Take 1 Tab by mouth daily.  Qty: 30 Tab, Refills: 3    Associated Diagnoses: Mixed hyperlipidemia      dicyclomine HCl (DICYCLOMINE PO) Take  by mouth two (2) times a day. Indications: Pt unsure of dosage      !! Blood-Glucose Meter monitoring kit With needles and blood sugar strips # 100 each  Qty: 1 Kit, Refills: 0      Insulin Needles, Disposable, 31 gauge x 5/16\" ndle 25 needles  Qty: 1 Package, Refills: 11      !! insulin glargine (LANTUS,BASAGLAR) 100 unit/mL (3 mL) inpn 16 units daily, SQ  Qty: 3 Adjustable Dose Pre-filled Pen Syringe, Refills: 3      !! insulin glargine (LANTUS,BASAGLAR) 100 unit/mL (3 mL) inpn Give 16 unit sq daily  Please give needles for the pen # 30  Qty: 3 Adjustable Dose Pre-filled Pen Syringe, Refills: 3      !! Blood-Glucose Meter monitoring kit With needles and test strips  Qty: 1 Kit, Refills: 0       !! - Potential duplicate medications found. Please discuss with provider. ED COURSE: The patient's hospital course has been uncomplicated.

## 2019-11-17 NOTE — DISCHARGE INSTRUCTIONS
We hope that we have addressed all of your medical concerns. The examination and treatment you received in the Emergency Department were for an emergent problem and were not intended as complete care. It is important that you follow up with your healthcare provider(s) for ongoing care. If your symptoms worsen or do not improve as expected, and you are unable to reach your usual health care provider(s), you should return to the Emergency Department. Today's healthcare is undergoing tremendous change, and patient satisfaction surveys are one of the many tools to assess the quality of medical care. You may receive a survey from the [facility] regarding your experience in the Emergency Department. I hope that your experience has been completely positive, particularly the medical care that I provided. As such, please participate in the survey; anything less than excellent does not meet my expectations or intentions. [facility] and [facility] participate in nationally recognized quality of care measures. If your blood pressure is greater than 120/80, as reported below, we urge that you seek medical care to address the potential of high blood pressure, commonly known as hypertension. Hypertension can be hereditary or can be caused by certain medical conditions, pain, stress, or \"white coat syndrome. \"       Please make an appointment with your health care provider(s) for follow up of your Emergency Department visit. VITALS:   Patient Vitals for the past 8 hrs:   Temp Pulse Resp BP SpO2   11/16/19 2120 98.1 °F (36.7 °C) (!) 117 (!) 36 139/82 99 %          Thank you for allowing us to provide you with medical care today. We realize that you have many choices for your emergency care needs. Please choose us in the future for any continued health care needs. Marcy Monroy [address] [place] [address] [facility]   Office: [phone]            No results found for this or any previous visit (from the past 24 hour(s)). Xr Spine Lumb 2 Or 3 V    Result Date: 11/16/2019  EXAM: XR SPINE LUMB 2 OR 3 V INDICATION: Low back pain after fall TECHNIQUE: AP, lateral, and coned-down lateral views of the lumbar spine. COMPARISON: None. FINDINGS: Compression fracture of the L2 vertebral body with approximately 25% loss of height. Ill-defined superior endplate fracture. No other fracture. Mild osteopenia. Grade 2 anterolisthesis of L5 on S1 measures 12 mm. Bilateral L5 spondylolysis. Moderate facet arthrosis L4-S1. Osteopenia. IMPRESSION: 1. Age-indeterminate L2 partial compression fracture may be acute. 2. L5-S1 grade 2 anterolisthesis due to L5 spondylolysis and facet arthrosis. Xr Hip Lt W Or Wo Pelv 2-3 Vws    Result Date: 11/16/2019  EXAM: XR HIP LT W OR WO PELV 2-3 VWS INDICATION: Left hip pain after ground-level fall. COMPARISON: None. FINDINGS: An AP view of the pelvis and a frogleg lateral view of the left hip demonstrate no fracture, dislocation or other acute abnormality. Bilateral greater trochanteric enthesophytes. Bones are osteopenic. Radiodense foreign body in projection with the right iliac bone may be extracorporeal. Lumbar spine degenerative disc disease is partially imaged. Sacrum is not well evaluated due to bowel gas. IMPRESSION: No acute abnormality. Ct Spine Lumb Wo Cont    Result Date: 11/16/2019  EXAM:  CT LUMBAR SPINE WITHOUT  CONTRAST INDICATION: Low back pain, L2 fracture. COMPARISON: Lumbar spine views earlier this evening. CONTRAST: None. TECHNIQUE: Multislice helical CT of the lumbar spine was performed without intravenous contrast administration. Coronal and sagittal reconstructions were generated. CT dose reduction was achieved through use of a standardized protocol tailored for this examination and automatic exposure control for dose modulation.  Adaptive statistical iterative reconstruction (ASIR) was utilized. FINDINGS: 9 mm grade 1 anterolisthesis of L5 on S1 in the supine position. Bilateral L5 spondylolysis. Comminuted compression fracture of the L2 vertebral body is acute or subacute. No bony retropulsion. Approximately 25% loss of height of the L2 vertebral body. No other fracture. No evidence of bone lesion. The paravertebral soft tissues are within normal limits. Lower thoracic spine: No herniation or stenosis. L1-L2: No herniation or stenosis. L2-L3: No herniation or stenosis. L3-L4: Diffuse disc bulge. No stenosis. L4-L5: Diffuse disc bulge. No stenosis. L5-S1: Uncovered disc. Moderate bilateral foraminal stenosis. IMPRESSION: 1. Acute versus subacute L2 partial compression fracture. No bony retropulsion. Patient is a likely candidate for kyphoplasty. 2. L5-S1 grade 1 anterolisthesis due to bilateral L5 spondylolysis. Moderate bilateral foraminal stenosis at L5-S1. Patient Education        Preventing Falls: Care Instructions  Your Care Instructions    Getting around your home safely can be a challenge if you have injuries or health problems that make it easy for you to fall. Loose rugs and furniture in walkways are among the dangers for many older people who have problems walking or who have poor eyesight. People who have conditions such as arthritis, osteoporosis, or dementia also have to be careful not to fall. You can make your home safer with a few simple measures. Follow-up care is a key part of your treatment and safety. Be sure to make and go to all appointments, and call your doctor if you are having problems. It's also a good idea to know your test results and keep a list of the medicines you take. How can you care for yourself at home? Taking care of yourself  · You may get dizzy if you do not drink enough water. To prevent dehydration, drink plenty of fluids, enough so that your urine is light yellow or clear like water.  Choose water and other caffeine-free clear liquids. If you have kidney, heart, or liver disease and have to limit fluids, talk with your doctor before you increase the amount of fluids you drink. · Exercise regularly to improve your strength, muscle tone, and balance. Walk if you can. Swimming may be a good choice if you cannot walk easily. · Have your vision and hearing checked each year or any time you notice a change. If you have trouble seeing and hearing, you might not be able to avoid objects and could lose your balance. · Know the side effects of the medicines you take. Ask your doctor or pharmacist whether the medicines you take can affect your balance. Sleeping pills or sedatives can affect your balance. · Limit the amount of alcohol you drink. Alcohol can impair your balance and other senses. · Ask your doctor whether calluses or corns on your feet need to be removed. If you wear loose-fitting shoes because of calluses or corns, you can lose your balance and fall. · Talk to your doctor if you have numbness in your feet. Preventing falls at home  · Remove raised doorway thresholds, throw rugs, and clutter. Repair loose carpet or raised areas in the floor. · Move furniture and electrical cords to keep them out of walking paths. · Use nonskid floor wax, and wipe up spills right away, especially on ceramic tile floors. · If you use a walker or cane, put rubber tips on it. If you use crutches, clean the bottoms of them regularly with an abrasive pad, such as steel wool. · Keep your house well lit, especially Corita Bal, and outside walkways. Use night-lights in areas such as hallways and bathrooms. Add extra light switches or use remote switches (such as switches that go on or off when you clap your hands) to make it easier to turn lights on if you have to get up during the night. · Install sturdy handrails on stairways.   · Move items in your cabinets so that the things you use a lot are on the lower shelves (about waist level). · Keep a cordless phone and a flashlight with new batteries by your bed. If possible, put a phone in each of the main rooms of your house, or carry a cell phone in case you fall and cannot reach a phone. Or, you can wear a device around your neck or wrist. You push a button that sends a signal for help. · Wear low-heeled shoes that fit well and give your feet good support. Use footwear with nonskid soles. Check the heels and soles of your shoes for wear. Repair or replace worn heels or soles. · Do not wear socks without shoes on wood floors. · Walk on the grass when the sidewalks are slippery. If you live in an area that gets snow and ice in the winter, sprinkle salt on slippery steps and sidewalks. Preventing falls in the bath  · Install grab bars and nonskid mats inside and outside your shower or tub and near the toilet and sinks. · Use shower chairs and bath benches. · Use a hand-held shower head that will allow you to sit while showering. · Get into a tub or shower by putting the weaker leg in first. Get out of a tub or shower with your strong side first.  · Repair loose toilet seats and consider installing a raised toilet seat to make getting on and off the toilet easier. · Keep your bathroom door unlocked while you are in the shower. Where can you learn more? Go to http://clarence-dereje.info/. Enter 0476 [id number] in the search box to learn more about \"Preventing Falls: Care Instructions. \"  Current as of: November 7, 2018  Content Version: 12.2  © 9699-9539 [employer], [employer]. Care instructions adapted under license by [employer] (which disclaims liability or warranty for this information). If you have questions about a medical condition or this instruction, always ask your healthcare professional. [address] any warranty or liability for your use of this information.          Patient Education        Compression Fracture of the Spine: Care Instructions  Your Care Instructions    A compression fracture happens when the front part of a spinal bone breaks and collapses. A fall or other accident can cause it. A minor injury or moving the wrong way can cause a break if you have thin or brittle bones (osteoporosis). These types of breaks will heal in 8 to 10 weeks. You will need rest and pain medicines. Your doctor may recommend physical therapy. Some doctors recommend that certain people with compression fractures wear braces. Your doctor also may treat thin or brittle bones. You may need surgery if you have lasting pain or if the bone presses on the spinal cord or nerves. You heal best when you take good care of yourself. Eat a variety of healthy foods, and don't smoke. Follow-up care is a key part of your treatment and safety. Be sure to make and go to all appointments, and call your doctor if you are having problems. It's also a good idea to know your test results and keep a list of the medicines you take. How can you care for yourself at home? · Be safe with medicines. Read and follow all instructions on the label. ? If the doctor gave you a prescription medicine for pain, take it as prescribed. ? If you are not taking a prescription pain medicine, ask your doctor if you can take an over-the-counter medicine. · Talk to your doctor about how to make your bones stronger. You may need medicines or a change in what you eat. · Be active only as directed by your doctor. When should you call for help? Call 911 anytime you think you may need emergency care. For example, call if:    · You are unable to move an arm or a leg at all.   [facility] your doctor now or seek immediate medical care if:    · You have new or worse symptoms in your arms, legs, belly, or buttocks. Symptoms may include:  ? Numbness or tingling. ? Weakness.   ? Pain.     · You lose bladder or bowel control.     · You have belly pain, bloating, vomiting, or nausea.   Jayme Sharma closely for changes in your health, and be sure to contact your doctor if:    · You do not get better as expected. Where can you learn more? Go to http://clarence-dereje.info/. Enter P445 in the search box to learn more about \"Compression Fracture of the Spine: Care Instructions. \"  Current as of: June 26, 2019  Content Version: 12.2  © 1803-6749 [employer]. Care instructions adapted under license by [employer] (which disclaims liability or warranty for this information). If you have questions about a medical condition or this instruction, always ask your healthcare professional. [place] [address] any warranty or liability for your use of this information. Patient Education        Kyphoplasty: Before Your Procedure  What is kyphoplasty? Kyphoplasty (say \"FT-rze-xgju-mamta\") is a procedure for your back. It is done to relieve pain from compression fractures of the spine. It can return your vertebrae to a more normal shape. The doctor makes a small cut in your back. Then he or she inserts a hollow needle or tube called a trocar. Fluoroscopy, a kind of X-ray, is used to guide the needle to the fractured vertebra. When the needle is in place, the doctor inserts a balloon. The balloon is inflated and then deflated. Using the hollow needle, the doctor puts a cement substance into the space created by the balloon. It takes about 1 to 2 hours to treat each vertebra. You may go home that day, or you may spend the night in the hospital.  Most people are able to go back to their normal activities within a day. Follow-up care is a key part of your treatment and safety. Be sure to make and go to all appointments, and call your doctor if you are having problems. It's also a good idea to know your test results and keep a list of the medicines you take.   What happens before the procedure?   Preparing for the procedure    · Understand exactly what procedure is planned, along with the risks, benefits, and other options. · Tell your doctors ALL the medicines, vitamins, supplements, and herbal remedies you take. Some of these can increase the risk of bleeding or interact with anesthesia.     · If you take blood thinners, such as warfarin (Coumadin), clopidogrel (Plavix), or aspirin, be sure to talk to your doctor. He or she will tell you if you should stop taking these medicines before your procedure. Make sure that you understand exactly what your doctor wants you to do.     · Your doctor will tell you which medicines to take or stop before your procedure. You may need to stop taking certain medicines a week or more before the procedure. So talk to your doctor as soon as you can.     · If you have an advance directive, let your doctor know. It may include a living will and a durable power of  for health care. Bring a copy to the hospital. If you don't have one, you may want to prepare one. It lets your doctor and loved ones know your health care wishes. Doctors advise that everyone prepare these papers before any type of surgery or procedure. Procedures can be stressful. This information will help you understand what you can expect. And it will help you safely prepare for your procedure. What happens on the day of the procedure? · Follow the instructions exactly about when to stop eating and drinking. If you don't, your procedure may be canceled. If your doctor told you to take your medicines on the day of the procedure, take them with only a sip of water.     · Take a bath or shower before you come in for your procedure. Do not apply lotions, perfumes, deodorants, or nail polish.     · Take off all jewelry and piercings. And take out contact lenses, if you wear them.    At the hospital or surgery center   · Bring a picture ID.     · You will be kept comfortable and safe by your anesthesia provider.  You may get medicine that relaxes you or puts you in a light sleep. The area being worked on will be numb. Going home   · Be sure you have someone to drive you home. Anesthesia and pain medicine make it unsafe for you to drive.     · You will be given more specific instructions about recovering from your procedure. They will cover things like diet, wound care, follow-up care, driving, and getting back to your normal routine. When should you call your doctor? · You have questions or concerns.     · You don't understand how to prepare for your procedure.     · You become ill before the procedure (such as fever, flu, or a cold).     · You need to reschedule or have changed your mind about having the procedure. Where can you learn more? Go to http://clarenceEruditor Groupdereje.info/. Enter B529 in the search box to learn more about \"Kyphoplasty: Before Your Procedure. \"  Current as of: June 26, 2019  Content Version: 12.2  © 7679-0551 [employer]wise[employer]. Care instructions adapted under license by [employer] (which disclaims liability or warranty for this information). If you have questions about a medical condition or this instruction, always ask your healthcare professional. [place] [address] any warranty or liability for your use of this information. Patient Education   If you see this message, please contact your IT team to request the [employer] [place] and Farsi documents.

## 2019-11-17 NOTE — ED TRIAGE NOTES
Per daughter patient was walking in the kitchen and fell on the floor. Daughter reports patient doesn't speak English and has a tendency to isolate herself in the home. Patient's daugther reports she is unsure if her mother is taking her home medications.

## 2019-11-17 NOTE — ED NOTES
The patient was discharged home by Dr. Yoshi Sigala and Shailesh Fernandez rn in stable condition, accompanied by daughter. The patient is alert and oriented, is in no respiratory distress and has vital signs within normal limits . The patient's diagnosis, condition and treatment were explained to daughter. The daughter expressed understanding. Prescriptions given to pt. No work/school note given to pt. A discharge plan has been developed. A  was not involved in the process. Aftercare instructions were given to the patient. Pt was offered a wheelchair ride tot he front of the ED, but decline. Daughter will transport pt home.

## 2019-11-18 ENCOUNTER — APPOINTMENT (OUTPATIENT)
Dept: GENERAL RADIOLOGY | Age: 59
DRG: 310 | End: 2019-11-18
Attending: EMERGENCY MEDICINE
Payer: COMMERCIAL

## 2019-11-18 ENCOUNTER — HOSPITAL ENCOUNTER (INPATIENT)
Age: 59
LOS: 1 days | Discharge: HOME | DRG: 310 | End: 2019-11-21
Attending: EMERGENCY MEDICINE | Admitting: INTERNAL MEDICINE
Payer: COMMERCIAL

## 2019-11-18 ENCOUNTER — OFFICE VISIT (OUTPATIENT)
Dept: FAMILY MEDICINE CLINIC | Age: 59
End: 2019-11-18

## 2019-11-18 VITALS
HEART RATE: 132 BPM | HEIGHT: 63 IN | BODY MASS INDEX: 25.37 KG/M2 | OXYGEN SATURATION: 97 % | WEIGHT: 143.2 LBS | DIASTOLIC BLOOD PRESSURE: 40 MMHG | SYSTOLIC BLOOD PRESSURE: 86 MMHG | TEMPERATURE: 98.8 F | RESPIRATION RATE: 18 BRPM

## 2019-11-18 DIAGNOSIS — R53.1 WEAKNESS: Primary | ICD-10-CM

## 2019-11-18 DIAGNOSIS — E11.65 UNCONTROLLED TYPE 2 DIABETES MELLITUS WITH HYPERGLYCEMIA (HCC): ICD-10-CM

## 2019-11-18 DIAGNOSIS — R03.1 LOW BLOOD PRESSURE READING: ICD-10-CM

## 2019-11-18 DIAGNOSIS — R00.0 TACHYCARDIA: ICD-10-CM
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
## 2019-11-18 DIAGNOSIS — R73.9 HYPERGLYCEMIA: ICD-10-CM

## 2019-11-18 DIAGNOSIS — E86.0 DEHYDRATION: ICD-10-CM

## 2019-11-18 DIAGNOSIS — S32.020S COMPRESSION FRACTURE OF L2 VERTEBRA, SEQUELA: ICD-10-CM

## 2019-11-18 DIAGNOSIS — R00.0 TACHYCARDIA: Primary | ICD-10-CM

## 2019-11-18 PROBLEM — N39.0 UTI (URINARY TRACT INFECTION): Status: ACTIVE | Noted: 2019-11-18

## 2019-11-18 PROBLEM — S32.020A COMPRESSION FRACTURE OF L2 (HCC): Status: ACTIVE | Noted: 2019-11-18

## 2019-11-18 LAB
ALBUMIN SERPL-MCNC: 3.2 G/DL (ref 3.5–5)
ALBUMIN/GLOB SERPL: 0.8 {RATIO} (ref 1.1–2.2)
ALP SERPL-CCNC: 116 U/L (ref 45–117)
ALT SERPL-CCNC: 12 U/L (ref 12–78)
ANION GAP SERPL CALC-SCNC: 9 MMOL/L (ref 5–15)
APPEARANCE UR: ABNORMAL
AST SERPL-CCNC: 10 U/L (ref 15–37)
ATRIAL RATE: 107 BPM
BACTERIA URNS QL MICRO: ABNORMAL /HPF
BASOPHILS # BLD: 0.1 K/UL (ref 0–0.1)
BASOPHILS NFR BLD: 1 % (ref 0–1)
BILIRUB SERPL-MCNC: 0.5 MG/DL (ref 0.2–1)
BILIRUB UR QL: NEGATIVE
BUN SERPL-MCNC: 17 MG/DL (ref 6–20)
BUN/CREAT SERPL: 32 (ref 12–20)
CALCIUM SERPL-MCNC: 9.3 MG/DL (ref 8.5–10.1)
CALCULATED P AXIS, ECG09: 43 DEGREES
CALCULATED R AXIS, ECG10: 52 DEGREES
CALCULATED T AXIS, ECG11: 8 DEGREES
CHLORIDE SERPL-SCNC: 100 MMOL/L (ref 97–108)
CO2 SERPL-SCNC: 24 MMOL/L (ref 21–32)
COLOR UR: ABNORMAL
COMMENT, HOLDF: NORMAL
CREAT SERPL-MCNC: 0.53 MG/DL (ref 0.55–1.02)
DIAGNOSIS, 93000: NORMAL
DIFFERENTIAL METHOD BLD: ABNORMAL
EOSINOPHIL # BLD: 0.2 K/UL (ref 0–0.4)
EOSINOPHIL NFR BLD: 3 % (ref 0–7)
EPITH CASTS URNS QL MICRO: ABNORMAL /LPF
ERYTHROCYTE [DISTWIDTH] IN BLOOD BY AUTOMATED COUNT: 13.3 % (ref 11.5–14.5)
GLOBULIN SER CALC-MCNC: 3.9 G/DL (ref 2–4)
GLUCOSE BLD STRIP.AUTO-MCNC: 160 MG/DL (ref 65–100)
GLUCOSE BLD STRIP.AUTO-MCNC: 242 MG/DL (ref 65–100)
GLUCOSE SERPL-MCNC: 339 MG/DL (ref 65–100)
GLUCOSE UR STRIP.AUTO-MCNC: >1000 MG/DL
HCT VFR BLD AUTO: 44.6 % (ref 35–47)
HGB BLD-MCNC: 14.3 G/DL (ref 11.5–16)
HGB UR QL STRIP: ABNORMAL
HYALINE CASTS URNS QL MICRO: ABNORMAL /LPF (ref 0–5)
IMM GRANULOCYTES # BLD AUTO: 0 K/UL (ref 0–0.04)
IMM GRANULOCYTES NFR BLD AUTO: 0 % (ref 0–0.5)
KETONES UR QL STRIP.AUTO: NEGATIVE MG/DL
LEUKOCYTE ESTERASE UR QL STRIP.AUTO: ABNORMAL
LYMPHOCYTES # BLD: 2 K/UL (ref 0.8–3.5)
LYMPHOCYTES NFR BLD: 24 % (ref 12–49)
MAGNESIUM SERPL-MCNC: 1.9 MG/DL (ref 1.6–2.4)
MCH RBC QN AUTO: 26.5 PG (ref 26–34)
MCHC RBC AUTO-ENTMCNC: 32.1 G/DL (ref 30–36.5)
MCV RBC AUTO: 82.7 FL (ref 80–99)
MONOCYTES # BLD: 0.5 K/UL (ref 0–1)
MONOCYTES NFR BLD: 6 % (ref 5–13)
NEUTS SEG # BLD: 5.3 K/UL (ref 1.8–8)
NEUTS SEG NFR BLD: 66 % (ref 32–75)
NITRITE UR QL STRIP.AUTO: POSITIVE
NRBC # BLD: 0 K/UL (ref 0–0.01)
NRBC BLD-RTO: 0 PER 100 WBC
P-R INTERVAL, ECG05: 134 MS
PH UR STRIP: 5.5 [PH] (ref 5–8)
PLATELET # BLD AUTO: 234 K/UL (ref 150–400)
PMV BLD AUTO: 11.1 FL (ref 8.9–12.9)
POTASSIUM SERPL-SCNC: 3.3 MMOL/L (ref 3.5–5.1)
PROT SERPL-MCNC: 7.1 G/DL (ref 6.4–8.2)
PROT UR STRIP-MCNC: ABNORMAL MG/DL
Q-T INTERVAL, ECG07: 346 MS
QRS DURATION, ECG06: 82 MS
QTC CALCULATION (BEZET), ECG08: 461 MS
RBC # BLD AUTO: 5.39 M/UL (ref 3.8–5.2)
RBC #/AREA URNS HPF: ABNORMAL /HPF (ref 0–5)
SAMPLES BEING HELD,HOLD: NORMAL
SERVICE CMNT-IMP: ABNORMAL
SERVICE CMNT-IMP: ABNORMAL
SODIUM SERPL-SCNC: 133 MMOL/L (ref 136–145)
SP GR UR REFRACTOMETRY: 1.03 (ref 1–1.03)
UR CULT HOLD, URHOLD: NORMAL
UROBILINOGEN UR QL STRIP.AUTO: 1 EU/DL (ref 0.2–1)
VENTRICULAR RATE, ECG03: 107 BPM
WBC # BLD AUTO: 8.1 K/UL (ref 3.6–11)
WBC URNS QL MICRO: >100 /HPF (ref 0–4)

## 2019-11-18 PROCEDURE — 87186 SC STD MICRODIL/AGAR DIL: CPT

## 2019-11-18 PROCEDURE — 36415 COLL VENOUS BLD VENIPUNCTURE: CPT

## 2019-11-18 PROCEDURE — 93005 ELECTROCARDIOGRAM TRACING: CPT

## 2019-11-18 PROCEDURE — 87086 URINE CULTURE/COLONY COUNT: CPT

## 2019-11-18 PROCEDURE — 87077 CULTURE AEROBIC IDENTIFY: CPT

## 2019-11-18 PROCEDURE — 99218 HC RM OBSERVATION: CPT

## 2019-11-18 PROCEDURE — 96375 TX/PRO/DX INJ NEW DRUG ADDON: CPT

## 2019-11-18 PROCEDURE — 74011250636 HC RX REV CODE- 250/636: Performed by: EMERGENCY MEDICINE

## 2019-11-18 PROCEDURE — 99285 EMERGENCY DEPT VISIT HI MDM: CPT

## 2019-11-18 PROCEDURE — 83735 ASSAY OF MAGNESIUM: CPT

## 2019-11-18 PROCEDURE — 85025 COMPLETE CBC W/AUTO DIFF WBC: CPT

## 2019-11-18 PROCEDURE — 74011000258 HC RX REV CODE- 258: Performed by: EMERGENCY MEDICINE

## 2019-11-18 PROCEDURE — 74011250636 HC RX REV CODE- 250/636: Performed by: STUDENT IN AN ORGANIZED HEALTH CARE EDUCATION/TRAINING PROGRAM

## 2019-11-18 PROCEDURE — 74011250637 HC RX REV CODE- 250/637: Performed by: STUDENT IN AN ORGANIZED HEALTH CARE EDUCATION/TRAINING PROGRAM

## 2019-11-18 PROCEDURE — 96361 HYDRATE IV INFUSION ADD-ON: CPT

## 2019-11-18 PROCEDURE — 82962 GLUCOSE BLOOD TEST: CPT

## 2019-11-18 PROCEDURE — 71045 X-RAY EXAM CHEST 1 VIEW: CPT

## 2019-11-18 PROCEDURE — 96372 THER/PROPH/DIAG INJ SC/IM: CPT

## 2019-11-18 PROCEDURE — 74011250637 HC RX REV CODE- 250/637: Performed by: EMERGENCY MEDICINE

## 2019-11-18 PROCEDURE — 96365 THER/PROPH/DIAG IV INF INIT: CPT

## 2019-11-18 PROCEDURE — 80053 COMPREHEN METABOLIC PANEL: CPT

## 2019-11-18 PROCEDURE — 74011636637 HC RX REV CODE- 636/637: Performed by: EMERGENCY MEDICINE

## 2019-11-18 PROCEDURE — 81001 URINALYSIS AUTO W/SCOPE: CPT

## 2019-11-18 RX ORDER — FLUOXETINE 10 MG/1
10 CAPSULE ORAL DAILY
Status: DISCONTINUED | OUTPATIENT
Start: 2019-11-19 | End: 2019-11-21 | Stop reason: HOSPADM

## 2019-11-18 RX ORDER — SODIUM CHLORIDE 0.9 % (FLUSH) 0.9 %
5-40 SYRINGE (ML) INJECTION AS NEEDED
Status: DISCONTINUED | OUTPATIENT
Start: 2019-11-18 | End: 2019-11-21 | Stop reason: HOSPADM

## 2019-11-18 RX ORDER — AMOXICILLIN 250 MG
2 CAPSULE ORAL DAILY
Status: DISCONTINUED | OUTPATIENT
Start: 2019-11-19 | End: 2019-11-21 | Stop reason: HOSPADM

## 2019-11-18 RX ORDER — HYDROCODONE BITARTRATE AND ACETAMINOPHEN 5; 325 MG/1; MG/1
1 TABLET ORAL
Status: COMPLETED | OUTPATIENT
Start: 2019-11-18 | End: 2019-11-18

## 2019-11-18 RX ORDER — SODIUM CHLORIDE 9 MG/ML
100 INJECTION, SOLUTION INTRAVENOUS CONTINUOUS
Status: DISCONTINUED | OUTPATIENT
Start: 2019-11-18 | End: 2019-11-21

## 2019-11-18 RX ORDER — SODIUM CHLORIDE 0.9 % (FLUSH) 0.9 %
5-40 SYRINGE (ML) INJECTION EVERY 8 HOURS
Status: DISCONTINUED | OUTPATIENT
Start: 2019-11-18 | End: 2019-11-21 | Stop reason: HOSPADM

## 2019-11-18 RX ORDER — MAGNESIUM SULFATE 100 %
4 CRYSTALS MISCELLANEOUS AS NEEDED
Status: DISCONTINUED | OUTPATIENT
Start: 2019-11-18 | End: 2019-11-21 | Stop reason: HOSPADM

## 2019-11-18 RX ORDER — HYDROCODONE BITARTRATE AND ACETAMINOPHEN 7.5; 325 MG/1; MG/1
1 TABLET ORAL
Status: DISCONTINUED | OUTPATIENT
Start: 2019-11-18 | End: 2019-11-21 | Stop reason: HOSPADM

## 2019-11-18 RX ORDER — CHLORPROMAZINE HYDROCHLORIDE 100 MG/1
100 TABLET, FILM COATED ORAL 3 TIMES DAILY
Refills: 2 | COMMUNITY
Start: 2019-10-21

## 2019-11-18 RX ORDER — ACETAMINOPHEN 325 MG/1
650 TABLET ORAL
Status: DISCONTINUED | OUTPATIENT
Start: 2019-11-18 | End: 2019-11-21 | Stop reason: HOSPADM

## 2019-11-18 RX ORDER — CHLORPROMAZINE HYDROCHLORIDE 50 MG/1
100 TABLET, FILM COATED ORAL 3 TIMES DAILY
Status: DISCONTINUED | OUTPATIENT
Start: 2019-11-18 | End: 2019-11-21 | Stop reason: HOSPADM

## 2019-11-18 RX ORDER — LEVOTHYROXINE SODIUM 100 UG/1
100 TABLET ORAL
Status: DISCONTINUED | OUTPATIENT
Start: 2019-11-19 | End: 2019-11-21 | Stop reason: HOSPADM

## 2019-11-18 RX ORDER — ATORVASTATIN CALCIUM 20 MG/1
20 TABLET, FILM COATED ORAL DAILY
Status: DISCONTINUED | OUTPATIENT
Start: 2019-11-19 | End: 2019-11-21 | Stop reason: HOSPADM

## 2019-11-18 RX ORDER — POTASSIUM CHLORIDE 750 MG/1
40 TABLET, FILM COATED, EXTENDED RELEASE ORAL
Status: COMPLETED | OUTPATIENT
Start: 2019-11-18 | End: 2019-11-18

## 2019-11-18 RX ORDER — ONDANSETRON 2 MG/ML
4 INJECTION INTRAMUSCULAR; INTRAVENOUS
Status: DISCONTINUED | OUTPATIENT
Start: 2019-11-18 | End: 2019-11-21 | Stop reason: HOSPADM

## 2019-11-18 RX ORDER — MORPHINE SULFATE 2 MG/ML
2 INJECTION, SOLUTION INTRAMUSCULAR; INTRAVENOUS
Status: DISCONTINUED | OUTPATIENT
Start: 2019-11-18 | End: 2019-11-21 | Stop reason: HOSPADM

## 2019-11-18 RX ORDER — FAMOTIDINE 20 MG/1
20 TABLET, FILM COATED ORAL 2 TIMES DAILY
Status: DISCONTINUED | OUTPATIENT
Start: 2019-11-18 | End: 2019-11-21 | Stop reason: HOSPADM

## 2019-11-18 RX ORDER — DEXTROSE MONOHYDRATE 100 MG/ML
0-250 INJECTION, SOLUTION INTRAVENOUS AS NEEDED
Status: DISCONTINUED | OUTPATIENT
Start: 2019-11-18 | End: 2019-11-21 | Stop reason: HOSPADM

## 2019-11-18 RX ORDER — ENOXAPARIN SODIUM 100 MG/ML
40 INJECTION SUBCUTANEOUS EVERY 24 HOURS
Status: DISCONTINUED | OUTPATIENT
Start: 2019-11-18 | End: 2019-11-21 | Stop reason: HOSPADM

## 2019-11-18 RX ADMIN — CHLORPROMAZINE HYDROCHLORIDE 100 MG: 50 TABLET, SUGAR COATED ORAL at 23:25

## 2019-11-18 RX ADMIN — POTASSIUM CHLORIDE 40 MEQ: 750 TABLET, FILM COATED, EXTENDED RELEASE ORAL at 21:14

## 2019-11-18 RX ADMIN — CEFTRIAXONE 1 G: 1 INJECTION, POWDER, FOR SOLUTION INTRAMUSCULAR; INTRAVENOUS at 19:28

## 2019-11-18 RX ADMIN — HUMAN INSULIN 4 UNITS: 100 INJECTION, SOLUTION SUBCUTANEOUS at 19:26

## 2019-11-18 RX ADMIN — ENOXAPARIN SODIUM 40 MG: 40 INJECTION SUBCUTANEOUS at 23:25

## 2019-11-18 RX ADMIN — SODIUM CHLORIDE 1000 ML: 900 INJECTION, SOLUTION INTRAVENOUS at 19:26

## 2019-11-18 RX ADMIN — FAMOTIDINE 20 MG: 20 TABLET ORAL at 23:25

## 2019-11-18 RX ADMIN — HYDROCODONE BITARTRATE AND ACETAMINOPHEN 1 TABLET: 5; 325 TABLET ORAL at 18:00

## 2019-11-18 NOTE — ED TRIAGE NOTES
Triage:  Pt to ED via referral of PCP due to concerns over abnormal vital signs and unrelieved back pain. Pt was involved in a fall this past Friday, seen at [facility] ER and was found to have an injury, Pt was referred to ortho services for continued care. Pt is having continued severe pain and limited ability to perform ADL's.

## 2019-11-18 NOTE — PROGRESS NOTES
Chief Complaint   Patient presents with   [facility] 11/15/2019 DX: Charis Lam FX: lower back    Depression     1. Have you been to the ER, urgent care clinic since your last visit? Hospitalized since your last visit? Yes Where: [facility]    2. Have you seen or consulted any other health care providers outside of the [address] [place] [address] since your last visit? Include any pap smears or colon screening.  No

## 2019-11-18 NOTE — PROGRESS NOTES
HISTORY OF PRESENT ILLNESS  Isabel Meyer is a 63 y.o. female. HPI: Following up from ER for compression fracture of L spine due to fall. Patient has appointment with ortho. She is wearing back support and taking hydrocodone for pain. She has history of uncontrolled diabetes and psychiatrist disorder/PTSD. She is only taking her po medication for diabetes due to afraid of the needles. Today she comes her blood pressure is brittany, heart is elevated. Daughter states that she only had breakfast. She was given 3 glasses of water upon arrival that she drank, her BP increased 100/60. Daughter states that she just gave her hydrocodone for pain. Patient lives with daughter. Her daughter works and goes to school and she ha s4 children, her  currently is not living with the family. Past Medical History:   Diagnosis Date    Diabetes ([facility] [address] [facility]     HTN (hypertension)     Obesity (BMI 35.0-39.9 without comorbidity)     Psychiatric disorder     PTSD    [facility]    History reviewed. No pertinent surgical history. No Known Allergies    Current Outpatient Medications:     HYDROcodone-acetaminophen (NORCO) 5-325 mg per tablet, Take 1 Tab by mouth every six (6) hours as needed for Pain for up to 5 days. Max Daily Amount: 4 Tabs., Disp: 20 Tab, Rfl: 0    senna-docusate (SENNA-S) 8.6-50 mg per tablet, Take 2 Tabs by mouth daily for 30 days. , Disp: 60 Tab, Rfl: 0    Back Brace misc, Use TLSO back brace as directed., Disp: 1 Device, Rfl: 0    metFORMIN ER (GLUCOPHAGE XR) 500 mg tablet, Take 2 pills twice a day, with breakfast and dinner, Disp: 120 Tab, Rfl: 2    glimepiride (AMARYL) 2 mg tablet, Take 1 Tab by mouth every morning., Disp: 60 Tab, Rfl: 2    FLUoxetine (PROZAC) 10 mg capsule, TAKE 1 CAPSULE BY MOUTH EVERY DAY, Disp: , Rfl: 1    glucose blood VI test strips (ASCENSIA AUTODISC VI, ONE TOUCH ULTRA TEST VI) strip, Use as directed, Disp: 25 Strip, Rfl: 11    Blood-Glucose Meter monitoring kit, With needles and test strips, Disp: 1 Kit, Rfl: 0    levothyroxine (SYNTHROID) 100 mcg tablet, Take 1 Tab by mouth Daily (before breakfast). , Disp: 30 Tab, Rfl: 5    raNITIdine (ZANTAC) 150 mg tablet, Take 1 Tab by mouth two (2) times a day., Disp: 60 Tab, Rfl: 5    atorvastatin (LIPITOR) 20 mg tablet, Take 1 Tab by mouth daily. , Disp: 30 Tab, Rfl: 3    dicyclomine HCl (DICYCLOMINE PO), Take  by mouth two (2) times a day. Indications: Pt unsure of dosage, Disp: , Rfl:   Review of Systems   Constitutional: Negative. Respiratory: Negative. Cardiovascular: Negative. Gastrointestinal: Negative. Musculoskeletal: Positive for back pain. Blood pressure (!) 86/40, pulse (!) 132, temperature 98.8 °F (37.1 °C), temperature source Oral, resp. rate 18, height 5' 3\" (1.6 m), weight 143 lb 3.2 oz (65 kg), SpO2 97 %. Physical Exam   Constitutional: No distress. HENT:   Mouth/Throat: Oropharynx is clear and moist.   Neck: Normal range of motion. Neck supple. Cardiovascular:   No murmur heard. HR, 130-135  BP /60   Pulmonary/Chest: Effort normal and breath sounds normal.   Abdominal: Soft. Bowel sounds are normal.   Musculoskeletal: She exhibits tenderness. On back brace   Vitals reviewed. ASSESSMENT and PLAN  Diagnoses and all orders for this visit:    1. Tachycardia    2. Low blood pressure reading    3. Uncontrolled type 2 diabetes mellitus with hyperglycemia ([address]    4.  Dehydration  Referred to ER, report called  Pt was given an after visit summary which includes diagnosis, current medicines and vital and voiced understanding of treatment plan

## 2019-11-18 NOTE — ED PROVIDER NOTES
63 y.o. female with past medical history significant for HTN, diabetes, obesity, and PTSD who presents from home via personal vehicle with chief complaint of back pain. Per daughter, pt fell on her lower back on Friday (11/15/19). She says pt was getting water when she states everything went \"blurry\" and then she fell but does not remember what happened. Then, pt got up and walked upstairs to lay in bed. The next day, when daughter came home noticed pt had not gotten out of bed all day and then took her to [facility] Emergency room around 7 PM. While at [facility] emergency room, daughter notes that pt had a CT scan and an X-ray performed, she was also given pain medication. She says the tests showed pt had lower back injury and was given hydrocodone to help with pain. Unfortunately, the daughter was told nothing would be able to be done for pt's back injury until (11/27/19) so she made an appointment with pt's PCP today to evaluate situation further because she is against having pt on hydrocodone for that long. Additionally, daughter notes pt was able to walk and go to bathroom on her own before her fall and does not use a cane or walker. Now pt is only able to walk on hydrocodone. Pt also notes accompanying lower abdominal pain after he fall. There are no other acute medical concerns at this time. PCP: Eugenio Torre NP    Note written by Jose Francisco Arenas, as dictated by Stuart Cross MD 4:49 PM            The history is provided by a relative. The history is limited by a language barrier. A  was used (Daughter). Past Medical History:   Diagnosis Date    Diabetes ([address]     HTN (hypertension)     Obesity (BMI 35.0-39.9 without comorbidity)     Psychiatric disorder     PTSD    Psychotic disorder ([facility] [address]        History reviewed. No pertinent surgical history.       Family History:   Problem Relation Age of Onset    Diabetes Mother     Heart Disease Mother        Social History     Socioeconomic History    Marital status:      Spouse name: Not on file    Number of children: Not on file    Years of education: Not on file    Highest education level: Not on file   Occupational History    Not on file   Social Needs    Financial resource strain: Not on file    Food insecurity:     Worry: Not on file     Inability: Not on file    Transportation needs:     Medical: Not on file     Non-medical: Not on file   Tobacco Use    Smoking status: Never Smoker    Smokeless tobacco: Never Used   Substance and Sexual Activity    Alcohol use: Not Currently    Drug use: Not Currently    Sexual activity: Not Currently   Lifestyle    Physical activity:     Days per week: Not on file     Minutes per session: Not on file    Stress: Not on file   Relationships    Social connections:     Talks on phone: Not on file     Gets together: Not on file     Attends [employer] service: Not on file     Active member of club or organization: Not on file     Attends meetings of clubs or organizations: Not on file     Relationship status: Not on file    Intimate partner violence:     Fear of current or ex partner: Not on file     Emotionally abused: Not on file     Physically abused: Not on file     Forced sexual activity: Not on file   Other Topics Concern    Not on file   Social History Narrative    Not on file         ALLERGIES: Patient has no known allergies. Review of Systems   Constitutional: Negative for appetite change, chills and fever. HENT: Negative for rhinorrhea, sore throat and trouble swallowing. Eyes: Positive for visual disturbance (blurry vision). Negative for photophobia. Respiratory: Negative for cough and shortness of breath. Cardiovascular: Negative for chest pain and palpitations. Gastrointestinal: Positive for abdominal pain (lower ). Negative for nausea and vomiting. Genitourinary: Negative for dysuria, frequency and hematuria.    Musculoskeletal: Positive for back pain (lower back). Negative for arthralgias. Neurological: Negative for dizziness, syncope and weakness. Psychiatric/Behavioral: Negative for behavioral problems. The patient is not nervous/anxious. Vitals:    11/18/19 1527 11/18/19 1630   BP: 111/70 131/81   Pulse: (!) 124    Resp: 18    Temp: 97.7 °F (36.5 °C)    SpO2: 97% 100%            Physical Exam   Constitutional: She appears well-developed and well-nourished. HENT:   Head: Normocephalic and atraumatic. Mouth/Throat: Oropharynx is clear and moist.   Eyes: Pupils are equal, round, and reactive to light. EOM are normal.   Neck: Normal range of motion. Neck supple. Cardiovascular: Regular rhythm, normal heart sounds and intact distal pulses. Exam reveals no gallop and no friction rub. No murmur heard. Pulse rate of 110. Pulmonary/Chest: Effort normal. No respiratory distress. She has no wheezes. She has no rales. Abdominal: Soft. There is no rebound. Mild suprapubic tenderness. Musculoskeletal: Normal range of motion. Lumbar tenderness. Neurological: She is alert. No cranial nerve deficit. Motor; symmetric   Skin: No erythema. Psychiatric: She has a normal mood and affect. Her behavior is normal.   Nursing note and vitals reviewed. Note written by Jose Francisco Barrios, as dictated by Gregoria Lance MD 4:49 PM      [facility]       Procedures               Hospitalist [place] Text for Admission  7:05 PM    ED Room Number: ER10/10  Patient Name and age: Ilana Carrion 63 y.o.  female  Working Diagnosis:   1. Weakness    2. Dehydration    3. Hyperglycemia    4. Tachycardia    5. Fall, subsequent encounter    6. Compression fracture of L2 vertebra, sequela      Readmission: no  Isolation Requirements:  no  Recommended Level of Care:  med/surg  Code Status:  FULL  Other:    Department:[facility] Adult ED - 21         ED EKG interpretation:  Rhythm: sinus tachycardia and PVC's; and regular .  Rate (approx.): 105; Axis: normal; P wave: normal; QRS interval: normal ; ST/T wave: non-specific changes; in  Lead: ; Other findings: . This EKG was interpreted by Mendoza Cason MD,ED Provider.  10:22 PM

## 2019-11-19 ENCOUNTER — APPOINTMENT (OUTPATIENT)
Dept: MRI IMAGING | Age: 59
DRG: 310 | End: 2019-11-19
Attending: NEUROLOGICAL SURGERY
Payer: COMMERCIAL

## 2019-11-19 LAB
ANION GAP SERPL CALC-SCNC: 9 MMOL/L (ref 5–15)
BUN SERPL-MCNC: 10 MG/DL (ref 6–20)
BUN/CREAT SERPL: 26 (ref 12–20)
CALCIUM SERPL-MCNC: 8.8 MG/DL (ref 8.5–10.1)
CHLORIDE SERPL-SCNC: 105 MMOL/L (ref 97–108)
CO2 SERPL-SCNC: 23 MMOL/L (ref 21–32)
CREAT SERPL-MCNC: 0.38 MG/DL (ref 0.55–1.02)
ERYTHROCYTE [DISTWIDTH] IN BLOOD BY AUTOMATED COUNT: 13.2 % (ref 11.5–14.5)
GLUCOSE BLD STRIP.AUTO-MCNC: 174 MG/DL (ref 65–100)
GLUCOSE BLD STRIP.AUTO-MCNC: 202 MG/DL (ref 65–100)
GLUCOSE BLD STRIP.AUTO-MCNC: 215 MG/DL (ref 65–100)
GLUCOSE BLD STRIP.AUTO-MCNC: 274 MG/DL (ref 65–100)
GLUCOSE SERPL-MCNC: 171 MG/DL (ref 65–100)
HCT VFR BLD AUTO: 43.9 % (ref 35–47)
HGB BLD-MCNC: 13.9 G/DL (ref 11.5–16)
MCH RBC QN AUTO: 26.3 PG (ref 26–34)
MCHC RBC AUTO-ENTMCNC: 31.7 G/DL (ref 30–36.5)
MCV RBC AUTO: 83.1 FL (ref 80–99)
NRBC # BLD: 0 K/UL (ref 0–0.01)
NRBC BLD-RTO: 0 PER 100 WBC
PLATELET # BLD AUTO: 230 K/UL (ref 150–400)
PMV BLD AUTO: 11.4 FL (ref 8.9–12.9)
POTASSIUM SERPL-SCNC: 3.3 MMOL/L (ref 3.5–5.1)
RBC # BLD AUTO: 5.28 M/UL (ref 3.8–5.2)
SERVICE CMNT-IMP: ABNORMAL
SODIUM SERPL-SCNC: 137 MMOL/L (ref 136–145)
WBC # BLD AUTO: 6.6 K/UL (ref 3.6–11)

## 2019-11-19 PROCEDURE — 74011636637 HC RX REV CODE- 636/637: Performed by: STUDENT IN AN ORGANIZED HEALTH CARE EDUCATION/TRAINING PROGRAM

## 2019-11-19 PROCEDURE — 99218 HC RM OBSERVATION: CPT

## 2019-11-19 PROCEDURE — 80048 BASIC METABOLIC PNL TOTAL CA: CPT

## 2019-11-19 PROCEDURE — 74011250636 HC RX REV CODE- 250/636: Performed by: STUDENT IN AN ORGANIZED HEALTH CARE EDUCATION/TRAINING PROGRAM

## 2019-11-19 PROCEDURE — 74011250637 HC RX REV CODE- 250/637: Performed by: INTERNAL MEDICINE

## 2019-11-19 PROCEDURE — 96375 TX/PRO/DX INJ NEW DRUG ADDON: CPT

## 2019-11-19 PROCEDURE — 72148 MRI LUMBAR SPINE W/O DYE: CPT

## 2019-11-19 PROCEDURE — 74011250637 HC RX REV CODE- 250/637: Performed by: STUDENT IN AN ORGANIZED HEALTH CARE EDUCATION/TRAINING PROGRAM

## 2019-11-19 PROCEDURE — 74011000258 HC RX REV CODE- 258: Performed by: STUDENT IN AN ORGANIZED HEALTH CARE EDUCATION/TRAINING PROGRAM

## 2019-11-19 PROCEDURE — 96376 TX/PRO/DX INJ SAME DRUG ADON: CPT

## 2019-11-19 PROCEDURE — 36415 COLL VENOUS BLD VENIPUNCTURE: CPT

## 2019-11-19 PROCEDURE — 96366 THER/PROPH/DIAG IV INF ADDON: CPT

## 2019-11-19 PROCEDURE — 85027 COMPLETE CBC AUTOMATED: CPT

## 2019-11-19 PROCEDURE — 82962 GLUCOSE BLOOD TEST: CPT

## 2019-11-19 RX ORDER — POTASSIUM CHLORIDE 750 MG/1
40 TABLET, FILM COATED, EXTENDED RELEASE ORAL EVERY 6 HOURS
Status: COMPLETED | OUTPATIENT
Start: 2019-11-19 | End: 2019-11-20

## 2019-11-19 RX ADMIN — MORPHINE SULFATE 2 MG: 2 INJECTION, SOLUTION INTRAMUSCULAR; INTRAVENOUS at 21:56

## 2019-11-19 RX ADMIN — LEVOTHYROXINE SODIUM 100 MCG: 100 TABLET ORAL at 07:15

## 2019-11-19 RX ADMIN — FAMOTIDINE 20 MG: 20 TABLET ORAL at 21:40

## 2019-11-19 RX ADMIN — MORPHINE SULFATE 2 MG: 2 INJECTION, SOLUTION INTRAMUSCULAR; INTRAVENOUS at 12:46

## 2019-11-19 RX ADMIN — HUMAN INSULIN 2 UNITS: 100 INJECTION, SOLUTION SUBCUTANEOUS at 21:55

## 2019-11-19 RX ADMIN — ENOXAPARIN SODIUM 40 MG: 40 INJECTION SUBCUTANEOUS at 21:40

## 2019-11-19 RX ADMIN — CHLORPROMAZINE HYDROCHLORIDE 100 MG: 50 TABLET, SUGAR COATED ORAL at 08:53

## 2019-11-19 RX ADMIN — POTASSIUM CHLORIDE 40 MEQ: 750 TABLET, FILM COATED, EXTENDED RELEASE ORAL at 12:46

## 2019-11-19 RX ADMIN — MORPHINE SULFATE 2 MG: 2 INJECTION, SOLUTION INTRAMUSCULAR; INTRAVENOUS at 17:01

## 2019-11-19 RX ADMIN — CEFTRIAXONE 1 G: 1 INJECTION, POWDER, FOR SOLUTION INTRAMUSCULAR; INTRAVENOUS at 18:19

## 2019-11-19 RX ADMIN — POTASSIUM CHLORIDE 40 MEQ: 750 TABLET, FILM COATED, EXTENDED RELEASE ORAL at 18:19

## 2019-11-19 RX ADMIN — ATORVASTATIN CALCIUM 20 MG: 20 TABLET, FILM COATED ORAL at 08:53

## 2019-11-19 RX ADMIN — HYDROCODONE BITARTRATE AND ACETAMINOPHEN 1 TABLET: 7.5; 325 TABLET ORAL at 08:57

## 2019-11-19 RX ADMIN — SODIUM CHLORIDE 100 ML/HR: 900 INJECTION, SOLUTION INTRAVENOUS at 12:46

## 2019-11-19 RX ADMIN — FLUOXETINE 10 MG: 10 CAPSULE ORAL at 08:53

## 2019-11-19 RX ADMIN — CHLORPROMAZINE HYDROCHLORIDE 100 MG: 50 TABLET, SUGAR COATED ORAL at 21:40

## 2019-11-19 RX ADMIN — HUMAN INSULIN 2 UNITS: 100 INJECTION, SOLUTION SUBCUTANEOUS at 12:46

## 2019-11-19 RX ADMIN — MORPHINE SULFATE 2 MG: 2 INJECTION, SOLUTION INTRAMUSCULAR; INTRAVENOUS at 00:14

## 2019-11-19 RX ADMIN — HUMAN INSULIN 5 UNITS: 100 INJECTION, SOLUTION SUBCUTANEOUS at 16:55

## 2019-11-19 RX ADMIN — HUMAN INSULIN 3 UNITS: 100 INJECTION, SOLUTION SUBCUTANEOUS at 07:15

## 2019-11-19 RX ADMIN — STANDARDIZED SENNA CONCENTRATE AND DOCUSATE SODIUM 2 TABLET: 8.6; 5 TABLET ORAL at 08:53

## 2019-11-19 RX ADMIN — CHLORPROMAZINE HYDROCHLORIDE 100 MG: 50 TABLET, SUGAR COATED ORAL at 16:55

## 2019-11-19 RX ADMIN — Medication 10 ML: at 21:41

## 2019-11-19 RX ADMIN — FAMOTIDINE 20 MG: 20 TABLET ORAL at 07:15

## 2019-11-19 NOTE — CONSULTS
Paged stat at 1:30am.  History noted. Recommend lumbar mri.   Possible ir consult for kypho pending mri

## 2019-11-19 NOTE — H&P
HISTORY AND PHYSICAL  Donavon Valdes MD        PCP: Dorina Dewitt NP    Chief Complaint:   Back pain    History of present illness:    Patient is a 13-year-old female with medical history significant for hypertension, type 2 diabetes, obesity and PTSD who presents to the emergency department accompanied by daughter with chief complaint of back pain. Patient reports she fell on her lower back last Friday could not recall what happened after that but the next day her back was hurting and was not able to get out of bed. Daughter took her to [facility] emergency department, had imaging studies done, was given pain medication which she was told that nothing can be done to the back injury until 11/27/2019. Patient also complains of urinary dysuria and suprapubic discomfort but denies any fever, chills, nausea, vomiting, shortness of breath, chest pain, weakness, paresthesia, urinary or bowel incontinence. In the emergency department, V/S were remarkable for tachycardia. Lab work-ups: K3.3  glucose 339. CT revealed Acute versus subacute L2 partial compression fracture. Melvenia Yeimi L5-S1 grade 1 anterolisthesis due to bilateral L5 spondylolysis. Moderatebilateral foraminal stenosis at L5-S1. PMH/PSH:  Past Medical History:   Diagnosis Date    Diabetes ([facility] [address]     HTN (hypertension)     Obesity (BMI 35.0-39.9 without comorbidity)     Psychiatric disorder     PTSD    Psychotic disorder ([facility] [address]      History reviewed. No pertinent surgical history. Home meds:   Prior to Admission medications    Medication Sig Start Date End Date Taking? Authorizing Provider   chlorproMAZINE (THORAZINE) 100 mg tablet TAKE 1 TABLET BY MOUTH THREE TIMES A DAY 10/21/19   Provider, Historical   HYDROcodone-acetaminophen (NORCO) 5-325 mg per tablet Take 1 Tab by mouth every six (6) hours as needed for Pain for up to 5 days. Max Daily Amount: 4 Tabs.  11/16/19 11/21/19  Huyen Adam, DO   senna-docusate (SENNA-S) 8.6-50 mg per tablet Take 2 Tabs by mouth daily for 30 days. 11/16/19 12/16/19  Leila Rowe DO   metFORMIN ER (GLUCOPHAGE XR) 500 mg tablet Take 2 pills twice a day, with breakfast and dinner 11/13/19   James Faye NP   glimepiride (AMARYL) 2 mg tablet Take 1 Tab by mouth every morning. 11/13/19   James Faye NP   FLUoxetine (PROZAC) 10 mg capsule TAKE 1 CAPSULE BY MOUTH EVERY DAY 10/3/19   Provider, Historical   levothyroxine (SYNTHROID) 100 mcg tablet Take 1 Tab by mouth Daily (before breakfast). 5/28/19   James Faye NP   raNITIdine (ZANTAC) 150 mg tablet Take 1 Tab by mouth two (2) times a day. 5/28/19   James Faye NP   atorvastatin (LIPITOR) 20 mg tablet Take 1 Tab by mouth daily. 5/17/19   James Faye NP   dicyclomine HCl (DICYCLOMINE PO) Take  by mouth two (2) times a day. Indications: Pt unsure of dosage    Provider, Historical       Allergies:  No Known Allergies    FH:  Family History   Problem Relation Age of Onset    Diabetes Mother     Heart Disease Mother        SH:  Social History     Tobacco Use    Smoking status: Never Smoker    Smokeless tobacco: Never Used   Substance Use Topics    Alcohol use: Not Currently         Review of Systems   Constitutional: Negative for chills, fever and weight loss. HENT: Negative for ear pain, hearing loss and tinnitus. Eyes: Negative for blurred vision, double vision and photophobia. Respiratory: Negative for cough, hemoptysis and sputum production. Cardiovascular: Negative for chest pain, palpitations and orthopnea. Gastrointestinal: Negative for heartburn, nausea and vomiting. Genitourinary: Negative for frequency, hematuria and urgency. Musculoskeletal: Positive for back pain. Negative for myalgias and neck pain. Skin: Negative for itching and rash. Neurological: Negative for dizziness and headaches. Endo/Heme/Allergies: Negative for environmental allergies. Does not bruise/bleed easily. Psychiatric/Behavioral: Negative for depression and suicidal ideas. All other systems reviewed and are negative. PHYSICAL EXAM:  Visit Vitals  /72   Pulse 95   Temp 97.7 °F (36.5 °C)   Resp 17   SpO2 99%   Breastfeeding? No       General:          Alert, cooperative, no distress, appears stated age. HEENT:           Atraumatic, anicteric sclerae, pink conjunctivae                          No oral ulcers, mucosa moist, throat clear, dentition fair  Neck:               Supple, symmetrical,  thyroid: non tender  Lungs:             Clear to auscultation bilaterally. No Wheezing or Rhonchi. No rales. Chest wall:      No tenderness  No Accessory muscle use. Heart:              Regular  rhythm,  No  murmur   No edema  Abdomen:        Soft, non-tender. Not distended. Bowel sounds normal  Extremities:     Lumbar tenderness. No cyanosis. No clubbing,                            Skin turgor normal, Capillary refill normal, Radial dial pulse 2+  Skin:                Not pale. Not Jaundiced  No rashes   Psych:             Not anxious or agitated. Neurologic:     Alert and oriented to PPT, CNII-XII intact. Motor and sensory exam grossly intact.   Labs/Imaging:  Recent Results (from the past 24 hour(s))   EKG, 12 LEAD, INITIAL    Collection Time: 11/18/19  5:25 PM   Result Value Ref Range    Ventricular Rate 107 BPM    Atrial Rate 107 BPM    P-R Interval 134 ms    QRS Duration 82 ms    Q-T Interval 346 ms    QTC Calculation (Bezet) 461 ms    Calculated P Axis 43 degrees    Calculated R Axis 52 degrees    Calculated T Axis 8 degrees    Diagnosis       Sinus tachycardia with occasional premature ventricular complexes  Nonspecific T wave abnormality  When compared with ECG of 23-OCT-2019 16:21,  premature ventricular complexes are now present  Inverted T waves have replaced nonspecific T wave abnormality in Inferior   leads  Nonspecific T wave abnormality now evident in Anterolateral leads     CBC WITH AUTOMATED DIFF    Collection Time: 11/18/19  5:31 PM   Result Value Ref Range    WBC 8.1 3.6 - 11.0 K/uL    RBC 5.39 (H) 3.80 - 5.20 M/uL    HGB 14.3 11.5 - 16.0 g/dL    HCT 44.6 35.0 - 47.0 %    MCV 82.7 80.0 - 99.0 FL    MCH 26.5 26.0 - 34.0 PG    MCHC 32.1 30.0 - 36.5 g/dL    RDW 13.3 11.5 - 14.5 %    PLATELET 127 257 - 541 K/uL    MPV 11.1 8.9 - 12.9 FL    NRBC 0.0 0  WBC    ABSOLUTE NRBC 0.00 0.00 - 0.01 K/uL    NEUTROPHILS 66 32 - 75 %    LYMPHOCYTES 24 12 - 49 %    MONOCYTES 6 5 - 13 %    EOSINOPHILS 3 0 - 7 %    BASOPHILS 1 0 - 1 %    IMMATURE GRANULOCYTES 0 0.0 - 0.5 %    ABS. NEUTROPHILS 5.3 1.8 - 8.0 K/UL    ABS. LYMPHOCYTES 2.0 0.8 - 3.5 K/UL    ABS. MONOCYTES 0.5 0.0 - 1.0 K/UL    ABS. EOSINOPHILS 0.2 0.0 - 0.4 K/UL    ABS. BASOPHILS 0.1 0.0 - 0.1 K/UL    ABS. IMM. GRANS. 0.0 0.00 - 0.04 K/UL    DF AUTOMATED     METABOLIC PANEL, COMPREHENSIVE    Collection Time: 11/18/19  5:31 PM   Result Value Ref Range    Sodium 133 (L) 136 - 145 mmol/L    Potassium 3.3 (L) 3.5 - 5.1 mmol/L    Chloride 100 97 - 108 mmol/L    CO2 24 21 - 32 mmol/L    Anion gap 9 5 - 15 mmol/L    Glucose 339 (H) 65 - 100 mg/dL    BUN 17 6 - 20 MG/DL    Creatinine 0.53 (L) 0.55 - 1.02 MG/DL    BUN/Creatinine ratio 32 (H) 12 - 20      GFR est AA >60 >60 ml/min/1.73m2    GFR est non-AA >60 >60 ml/min/1.73m2    Calcium 9.3 8.5 - 10.1 MG/DL    Bilirubin, total 0.5 0.2 - 1.0 MG/DL    ALT (SGPT) 12 12 - 78 U/L    AST (SGOT) 10 (L) 15 - 37 U/L    Alk. phosphatase 116 45 - 117 U/L    Protein, total 7.1 6.4 - 8.2 g/dL    Albumin 3.2 (L) 3.5 - 5.0 g/dL    Globulin 3.9 2.0 - 4.0 g/dL    A-G Ratio 0.8 (L) 1.1 - 2.2     SAMPLES BEING HELD    Collection Time: 11/18/19  5:31 PM   Result Value Ref Range    SAMPLES BEING HELD  1 RED, 1 GANGA, 1 BC GANGA, 1 BC LAV     COMMENT        Add-on orders for these samples will be processed based on acceptable specimen integrity and analyte stability, which may vary by analyte.    URINALYSIS W/MICROSCOPIC Collection Time: 11/18/19  5:54 PM   Result Value Ref Range    Color YELLOW/STRAW      Appearance CLOUDY (A) CLEAR      Specific gravity 1.027 1.003 - 1.030      pH (UA) 5.5 5.0 - 8.0      Protein TRACE (A) NEG mg/dL    Glucose >1,000 (A) NEG mg/dL    Ketone NEGATIVE  NEG mg/dL    Bilirubin NEGATIVE  NEG      Blood TRACE (A) NEG      Urobilinogen 1.0 0.2 - 1.0 EU/dL    Nitrites POSITIVE (A) NEG      Leukocyte Esterase SMALL (A) NEG      WBC >100 (H) 0 - 4 /hpf    RBC 0-5 0 - 5 /hpf    Epithelial cells FEW FEW /lpf    Bacteria 2+ (A) NEG /hpf    Hyaline cast 0-2 0 - 5 /lpf   URINE CULTURE HOLD SAMPLE    Collection Time: 11/18/19  5:54 PM   Result Value Ref Range    Urine culture hold        URINE ON HOLD IN MICROBIOLOGY DEPT FOR 3 DAYS. IF UNPRESERVED URINE IS SUBMITTED, IT CANNOT BE USED FOR ADDITIONAL TESTING AFTER 24 HRS, RECOLLECTION WILL BE REQUIRED. GLUCOSE, POC    Collection Time: 11/18/19  7:15 PM   Result Value Ref Range    Glucose (POC) 242 (H) 65 - 100 mg/dL    Performed by [address] [place], [place] [address]meena     11/18/19  1731   WBC 8.1   HGB 14.3   HCT 44.6        Recent Labs     11/18/19  1731   *   K 3.3*      CO2 24   BUN 17   CREA 0.53*   *   CA 9.3     Recent Labs     11/18/19  1731   SGOT 10*   ALT 12      TBILI 0.5   TP 7.1   ALB 3.2*   GLOB 3.9       No results for input(s): CPK, CKNDX, TROIQ in the last 72 hours. No lab exists for component: CPKMB    No results for input(s): INR, PTP, APTT, INREXT in the last 72 hours. No results for input(s): PH, PCO2, PO2 in the last 72 hours. XR CHEST PORT  Narrative: INDICATION: Fall, tachycardia. Portable AP semiupright view of the chest.    There is no prior study for direct comparison. Cardiomediastinal silhouette is within normal limits. Lungs are clear  bilaterally. Pleural spaces are normal. Osseous structures are intact. Impression: IMPRESSION: No acute cardiopulmonary disease.           Assessment & Plan:  ===============  Low back pain  Likely 2/2 L2 partial compression fracture and underlying DJD  CT revealed Acute versus subacute L2 partial compression fracture. No bony retropulsion. L5-S1 grade 1 anterolisthesis due to bilateral L5 spondylolysis. Moderatebilateral foraminal stenosis at L5-S1. Orthopedics consult  Analgesics    Type 2 DM Uncontrolled with Hyperglycemia   Hold Oral Meds  in house  SSI  Accu-Checks    Acute UTI  In the setting of dysuria and suprapubic discomfort  U/a remarkable for bacteriuria, pyuria positive for leukocyte esterase and nitrites  Continue ceftriaxone    Hypokalemia  Replete  Check magnesium    Hyponatremia  Likely secondary to volume depletion  Expect correction following IV fluid hydration  Consider further work-up if no improvement    H/o hypertension  Not in any medication  Hydralazine as needed      Patient's Baseline: Ambulates with walking  DVT ppx: Lovenox  code status: Full   disposition: TBD  Care plan discussed patient/family and nurse.                 Signed By: Jace Sullivan MD     November 18, 2019

## 2019-11-19 NOTE — PROGRESS NOTES
Madelyn    Facilitated conversation with patient and MARION Hanson with [employer]  [id number]. Patient was refusing to answer the questions the RN was asking via the  stating that the  was not a doctor, and why was the  asking so many questions and the  should mind her own business. During the conversation with the Labs on the [employer]  a family friend Bharati Palomares came to visit the patient who is an [address] [place] [address] trained . Pamela works in the [employer] on [address]. Patient was willing to answer questions with Pamela and MARION Hanson was able to do her assessment. Patient is also refusing to use the [employer]  phone which was in her room and in working order.                       Jose David Vidaels, [facility] Certified    can be requested at: [email or web address]

## 2019-11-19 NOTE — PROGRESS NOTES
Hospitalist Progress Note    NAME: Lucretia Darby   :  1960   MRN:  [id number]       Chief Complaint:   Back pain     History of present illness:     Patient is a 60-year-old female with medical history significant for hypertension, type 2 diabetes, obesity and PTSD who presents to the emergency department accompanied by daughter with chief complaint of back pain. Patient reports she fell on her lower back last Friday could not recall what happened after that but the next day her back was hurting and was not able to get out of bed. Daughter took her to [facility] emergency department, had imaging studies done, was given pain medication which she was told that nothing can be done to the back injury until 2019. Patient also complains of urinary dysuria and suprapubic discomfort but denies any fever, chills, nausea, vomiting, shortness of breath, chest pain, weakness, paresthesia, urinary or bowel incontinence.     In the emergency department, V/S were remarkable for tachycardia. Lab work-ups: K3.3  glucose 339. CT revealed Acute versus subacute L2 partial compression fracture. [place] Ace L5-S1 grade 1 anterolisthesis due to bilateral L5 spondylolysis. Moderatebilateral foraminal stenosis at L5-S1. PMH/PSH:    Assessment / Plan:      Low back pain  Likely 2/2 L2 partial compression fracture and underlying DJD  CT revealed Acute versus subacute L2 partial compression fracture. No bony retropulsion. L5-S1 grade 1 anterolisthesis due to bilateral L5 spondylolysis. Moderatebilateral foraminal stenosis at L5-S1.     Per [facility] get Lumbar MRI possible IR for Kypho pending MRI results    Pain Control     Type 2 DM BS this am 171  She is not eating that well so will continue to Hold Oral Meds  in house  RISSI  Accu-Checks     Acute UTI  In the setting of dysuria and suprapubic discomfort  U/a remarkable for bacteriuria, pyuria positive for leukocyte esterase and nitrites  Continue ceftriaxone     Hypokalemia 3.3 today  Replete  Check magnesium     Hyponatremia  Corrected  Likely secondary to volume depletion  Expect correction following IV fluid hydration  Consider further work-up if no improvement     H/o hypertension  Not in any medication  PRN Hydralazine    Body mass index is 25.01 kg/m². Code status: Full  Prophylaxis: Yes  Recommended Disposition: SNF/Acute rehab     Subjective:     Chief Complaint / Reason for Physician Visit  Back painDiscussed with RN events overnight. Review of Systems:  Symptom Y/N Comments  Symptom Y/N Comments   Fever/Chills n   Chest Pain n    Poor Appetite y   Edema     Cough    Abdominal Pain n    Sputum    Joint Pain     SOB/CARDOZA n   Pruritis/Rash     Nausea/vomit n   Tolerating PT/OT     Diarrhea    Tolerating Diet     Constipation    Other       Could NOT obtain due to:      Objective:     VITALS:   Last 24hrs VS reviewed since prior progress note.  Most recent are:  Patient Vitals for the past 24 hrs:   Temp Pulse Resp BP SpO2   11/19/19 0812 97.5 °F (36.4 °C) 99 20 106/65 96 %   11/19/19 0213 98.1 °F (36.7 °C) 94 20 125/80 99 %   11/18/19 2200 98 °F (36.7 °C) 94 20 119/70 98 %   11/18/19 2030 98 °F (36.7 °C) 97 20 117/68 100 %   11/18/19 2000  (!) 101 17 113/84 100 %   11/18/19 1945  95 17 114/72 99 %   11/18/19 1930  (!) 104 16 118/68 99 %   11/18/19 1915  (!) 104 17 104/59 100 %   11/18/19 1900  (!) 105 15 125/69 100 %   11/18/19 1845  (!) 101 15 126/78 100 %   11/18/19 1830  (!) 102 19 124/72 100 %   11/18/19 1815  (!) 103 15 117/81 100 %   11/18/19 1800  (!) 109 24 122/72 100 %   11/18/19 1730  (!) 111 19 127/75 100 %   11/18/19 1715  (!) 107 16 132/70    11/18/19 1700  (!) 110 15 135/74    11/18/19 1645  (!) 109 16 141/74 100 %   11/18/19 1630    131/81 100 %   11/18/19 1527 97.7 °F (36.5 °C) (!) 124 18 111/70 97 %       Intake/Output Summary (Last 24 hours) at 11/19/2019 0912  Last data filed at 11/19/2019 0900  Gross per 24 hour   Intake 400 ml   Output    Net 400 ml        PHYSICAL EXAM:  General: WD, WN. Alert, cooperative, no acute distress    EENT:  EOMI. Anicteric sclerae. MMM  Resp:  CTA bilaterally, no wheezing or rales. No accessory muscle use  CV:  Regular  rhythm,  No edema  GI:  Soft, Non distended, Non tender.  +Bowel sounds  Neurologic:  Alert and oriented X 3, normal speech, can move her feet, sensation ok  Psych:   Good insight. Not anxious nor agitated  Skin:  No rashes. No jaundice    Back: Pain to movement lumbar areaa    Reviewed most current lab test results and cultures  YES  Reviewed most current radiology test results   YES  Review and summation of old records today    NO  Reviewed patient's current orders and MAR    YES  PMH/SH reviewed - no change compared to H&P  ________________________________________________________________________  Care Plan discussed with:    Comments   Patient x    Family      RN x    Care Manager     Consultant                        Multidiciplinary team rounds were held today with , nursing, pharmacist and clinical coordinator. Patient's plan of care was discussed; medications were reviewed and discharge planning was addressed. ________________________________________________________________________  Total NON critical care TIME:  35    Minutes    Total CRITICAL CARE TIME Spent:   Minutes non procedure based      Comments   >50% of visit spent in counseling and coordination of care     ________________________________________________________________________  Darryl Diaz MD     Procedures: see electronic medical records for all procedures/Xrays and details which were not copied into this note but were reviewed prior to creation of Plan. LABS:  I reviewed today's most current labs and imaging studies.   Pertinent labs include:  Recent Labs     11/19/19 0114 11/18/19  1731   WBC 6.6 8.1   HGB 13.9 14.3   HCT 43.9 44.6    234     Recent Labs     11/19/19 0114 11/18/19  1731    133*   K 3.3* 3.3*    100   CO2 23 24   * 339*   BUN 10 17   CREA 0.38* 0.53*   CA 8.8 9.3   MG  --  1.9   ALB  --  3.2*   TBILI  --  0.5   SGOT  --  10*   ALT  --  12       Signed: Shanti Groves MD

## 2019-11-19 NOTE — PROGRESS NOTES
Received request from Care Manager for AMD consult on Ms [facility] in room 205. Patient speaks Yakut only.  checked with [facility] Vladimir Maria Eugenia, who stated they do not have an Yakut . Unable to do consult at this time. : Rev. Carri Au.  Caitlin; to contact [phone] call: [phone]

## 2019-11-19 NOTE — PROGRESS NOTES
Observation notice provided in writing to patient and/or caregiver as well as verbal explanation of the policy. Patients who are in outpatient status also receive the Observation notice.       TOMER Perkins

## 2019-11-19 NOTE — PROGRESS NOTES
Admission Medication Reconciliation:    Information obtained from: This medication history was obtained from the patient's daughter. The patient does not speak English and her daughter manages her medications. An RX Query is available. Summary:     Medications added: chlorpromazine  Medications deleted: dicyclomine     Inpatient orders were reviewed and no changes are needed. Chief Complaint for this Admission:  UTI     Significant PMH/Disease States:   Past Medical History:   Diagnosis Date    Diabetes ([address]     HTN (hypertension)     Obesity (BMI 35.0-39.9 without comorbidity)     Psychiatric disorder     PTSD    Psychotic disorder (HCC)        Allergies:  Patient has no known allergies. Prior to Admission Medications:   Prior to Admission Medications   Prescriptions Last Dose Informant Patient Reported? Taking? FLUoxetine (PROZAC) 10 mg capsule   Yes Yes   Sig: Take 10 mg by mouth daily. HYDROcodone-acetaminophen (NORCO) 5-325 mg per tablet   No Yes   Sig: Take 1 Tab by mouth every six (6) hours as needed for Pain for up to 5 days. Max Daily Amount: 4 Tabs. atorvastatin (LIPITOR) 20 mg tablet   No Yes   Sig: Take 1 Tab by mouth daily. chlorproMAZINE (THORAZINE) 100 mg tablet   Yes Yes   Sig: Take 100 mg by mouth three (3) times daily. glimepiride (AMARYL) 2 mg tablet   No Yes   Sig: Take 1 Tab by mouth every morning. levothyroxine (SYNTHROID) 100 mcg tablet   No Yes   Sig: Take 1 Tab by mouth Daily (before breakfast). metFORMIN ER (GLUCOPHAGE XR) 500 mg tablet   No Yes   Sig: Take 2 pills twice a day, with breakfast and dinner   raNITIdine (ZANTAC) 150 mg tablet   No Yes   Sig: Take 1 Tab by mouth two (2) times a day. senna-docusate (SENNA-S) 8.6-50 mg per tablet   No Yes   Sig: Take 2 Tabs by mouth daily for 30 days. Facility-Administered Medications: None         Thank you for allowing me to participate in the care of this patient.   Please contact the pharmacy () or the medication reconciliation pharmacist () with any questions. Philemon Soulier, Pharm. D., BCPS, BCPPS

## 2019-11-19 NOTE — DIABETES MGMT
[facility]    D[facility] Progress Note    Recommendations/ Comments: Noted A1c >14%. When pt is eating  If appropriate, please consider adding basal insulin to aid in home mgt of glucose.  (wgt based: 0.2 units/kg=12.8 units daily)     Current hospital DM medication: regular insulin correction scale    Chart reviewed on [address]. Patient is a 63 y.o. female with known  Type 2 Diabetes on oral agent (monotherapy): metformin 1000 mg bid (generic) at home. A1c:   Lab Results   Component Value Date/Time    Hemoglobin A1c 14.9 (H) 08/09/2019 11:20 AM    Hemoglobin A1c >15.5 (H) 05/03/2019 10:21 AM       Recent Glucose Results:   Lab Results   Component Value Date/Time     (H) 11/19/2019 01:14 AM     (H) 11/18/2019 05:31 PM    GLUCPOC 174 (H) 11/19/2019 11:57 AM    GLUCPOC 215 (H) 11/19/2019 06:16 AM    GLUCPOC 160 (H) 11/18/2019 10:52 PM        Lab Results   Component Value Date/Time    Creatinine 0.38 (L) 11/19/2019 01:14 AM     Estimated Creatinine Clearance: 76 mL/min (A) (by C-G formula based on SCr of 0.38 mg/dL (L)). Active Orders   Diet    DIET DIABETIC CONSISTENT CARB Regular; 2 GM NA (House Low NA)        PO intake:   Patient Vitals for the past 72 hrs:   % Diet Eaten   11/19/19 0900 35 %       Will continue to follow as needed.     Thank you          Time spent: 3 min

## 2019-11-19 NOTE — ED NOTES
1754: Patient assisted to bedside commode    1900: Patient resting in stretcher with no complaints at this time.  Will continue to monitor

## 2019-11-19 NOTE — ACP (ADVANCE CARE PLANNING)
Received request from Care Manager for AMD consult on Ms [facility] in room 205. Patient speaks Khmer only.  checked with [facility]icultural Rayna Huston, who stated they do not have an Khmer . Unable to do consult at this time. : Rev. Samanta Licona.  Caitlin; to contact [phone] call: [phone]

## 2019-11-19 NOTE — ED NOTES
TRANSFER - OUT REPORT:    Verbal report given to [employer] (name) on [address]  being transferred to  (unit) for routine progression of care       Report consisted of patients Situation, Background, Assessment and   Recommendations(SBAR). Information from the following report(s) SBAR, ED Summary and Recent Results was reviewed with the receiving nurse. Lines:   Peripheral IV 11/18/19 Left Antecubital (Active)   Site Assessment Clean, dry, & intact 11/18/2019  5:57 PM   Phlebitis Assessment 0 11/18/2019  5:57 PM   Infiltration Assessment 0 11/18/2019  5:57 PM   Dressing Status Clean, dry, & intact 11/18/2019  5:57 PM        Opportunity for questions and clarification was provided.       Patient transported with:   [email or web address]

## 2019-11-19 NOTE — PROGRESS NOTES
Bedside shift change report given to Chaparrita Reaves (oncoming nurse) by Neel Nathan (offgoing nurse). Report included the following information SBAR and Kardex.

## 2019-11-19 NOTE — PROGRESS NOTES
Patient only speaks Slovak Language. Transition of Care Plan     Disposition TBD pending therapy evaluation    Pt has psychiatric disorder-PTSD and ONLY speaks Slovak language    Transport: family or TBD    Continue Medical Care    Follow up with PCP/Specialist     Reason for Admission:   Referred by PCP due to concerns over abnormal vital signs and unrelieved back pain                    RRAT Score:     4-low               Plan for utilizing home health:  TBD pending therapy note                         Current Advanced Directive/Advance Care Plan: Patient is full code. CRM educated pt's daughter at bedside, agreeable to have AMD, david paged via                           Transition of Care Plan:                      CRM met patient family at bedside and obtained information from daughter and granddaughter. Patient only speaks Slovak language. Pt lives with her daughter in a bi-level house with 2 steps to enter. Per granddaughter, patient was independent until 5 days ago but now needs assistance with ADLs. Patient doesn't use DME but have been having difficulties walking recently. Per family, pt cries while ambulating. [facility]. Pt may benefit from regular PCP visit.      Care Management Interventions  PCP Verified by CM: Yes(a week ago)  Mode of Transport at Discharge: ALS  Transition of Care Consult (CM Consult): Discharge Planning  Physical Therapy Consult: No  Occupational Therapy Consult: No  Speech Therapy Consult: No  Current Support Network: Relative's Home(lives with daughter )  Confirm Follow Up Transport: Family(daughter and granddaughter )  Plan discussed with Pt/Family/Caregiver: Yes  Discharge Location  Discharge Placement: Unable to determine at this time(TBD )    Nicole Roger  Clinical     [phone]

## 2019-11-20 ENCOUNTER — ANESTHESIA EVENT (OUTPATIENT)
Dept: INTERVENTIONAL RADIOLOGY/VASCULAR | Age: 59
DRG: 310 | End: 2019-11-20
Payer: COMMERCIAL

## 2019-11-20 ENCOUNTER — APPOINTMENT (OUTPATIENT)
Dept: INTERVENTIONAL RADIOLOGY/VASCULAR | Age: 59
DRG: 310 | End: 2019-11-20
Attending: INTERNAL MEDICINE
Payer: COMMERCIAL

## 2019-11-20 ENCOUNTER — ANESTHESIA (OUTPATIENT)
Dept: INTERVENTIONAL RADIOLOGY/VASCULAR | Age: 59
DRG: 310 | End: 2019-11-20
Payer: COMMERCIAL

## 2019-11-20 PROBLEM — M54.50 LUMBAR PAIN: Status: ACTIVE | Noted: 2019-11-20

## 2019-11-20 LAB
ANION GAP SERPL CALC-SCNC: 6 MMOL/L (ref 5–15)
BACTERIA SPEC CULT: ABNORMAL
BUN SERPL-MCNC: 7 MG/DL (ref 6–20)
BUN/CREAT SERPL: 18 (ref 12–20)
CALCIUM SERPL-MCNC: 8.5 MG/DL (ref 8.5–10.1)
CC UR VC: ABNORMAL
CHLORIDE SERPL-SCNC: 111 MMOL/L (ref 97–108)
CO2 SERPL-SCNC: 25 MMOL/L (ref 21–32)
CREAT SERPL-MCNC: 0.38 MG/DL (ref 0.55–1.02)
ERYTHROCYTE [DISTWIDTH] IN BLOOD BY AUTOMATED COUNT: 13.4 % (ref 11.5–14.5)
GLUCOSE BLD STRIP.AUTO-MCNC: 161 MG/DL (ref 65–100)
GLUCOSE BLD STRIP.AUTO-MCNC: 188 MG/DL (ref 65–100)
GLUCOSE BLD STRIP.AUTO-MCNC: 254 MG/DL (ref 65–100)
GLUCOSE BLD STRIP.AUTO-MCNC: 255 MG/DL (ref 65–100)
GLUCOSE SERPL-MCNC: 195 MG/DL (ref 65–100)
HCT VFR BLD AUTO: 39 % (ref 35–47)
HGB BLD-MCNC: 12.5 G/DL (ref 11.5–16)
MCH RBC QN AUTO: 26.8 PG (ref 26–34)
MCHC RBC AUTO-ENTMCNC: 32.1 G/DL (ref 30–36.5)
MCV RBC AUTO: 83.7 FL (ref 80–99)
NRBC # BLD: 0 K/UL (ref 0–0.01)
NRBC BLD-RTO: 0 PER 100 WBC
PLATELET # BLD AUTO: 239 K/UL (ref 150–400)
PMV BLD AUTO: 11.4 FL (ref 8.9–12.9)
POTASSIUM SERPL-SCNC: 4.2 MMOL/L (ref 3.5–5.1)
RBC # BLD AUTO: 4.66 M/UL (ref 3.8–5.2)
SERVICE CMNT-IMP: ABNORMAL
SODIUM SERPL-SCNC: 142 MMOL/L (ref 136–145)
WBC # BLD AUTO: 5.2 K/UL (ref 3.6–11)

## 2019-11-20 PROCEDURE — 74011000258 HC RX REV CODE- 258: Performed by: STUDENT IN AN ORGANIZED HEALTH CARE EDUCATION/TRAINING PROGRAM

## 2019-11-20 PROCEDURE — 65270000029 HC RM PRIVATE

## 2019-11-20 PROCEDURE — 22514 PERQ VERTEBRAL AUGMENTATION: CPT

## 2019-11-20 PROCEDURE — 74011636320 HC RX REV CODE- 636/320: Performed by: RADIOLOGY

## 2019-11-20 PROCEDURE — 74011250636 HC RX REV CODE- 250/636: Performed by: RADIOLOGY

## 2019-11-20 PROCEDURE — 0QU03JZ SUPPLEMENT LUMBAR VERTEBRA WITH SYNTHETIC SUBSTITUTE, PERCUTANEOUS APPROACH: ICD-10-PCS | Performed by: RADIOLOGY

## 2019-11-20 PROCEDURE — 76060000033 HC ANESTHESIA 1 TO 1.5 HR

## 2019-11-20 PROCEDURE — 85027 COMPLETE CBC AUTOMATED: CPT

## 2019-11-20 PROCEDURE — 0QS03ZZ REPOSITION LUMBAR VERTEBRA, PERCUTANEOUS APPROACH: ICD-10-PCS | Performed by: RADIOLOGY

## 2019-11-20 PROCEDURE — 36415 COLL VENOUS BLD VENIPUNCTURE: CPT

## 2019-11-20 PROCEDURE — 74011636637 HC RX REV CODE- 636/637: Performed by: STUDENT IN AN ORGANIZED HEALTH CARE EDUCATION/TRAINING PROGRAM

## 2019-11-20 PROCEDURE — 99218 HC RM OBSERVATION: CPT

## 2019-11-20 PROCEDURE — 77030003666 HC NDL SPINAL BD -A

## 2019-11-20 PROCEDURE — 77030034842 HC TAMP SPN BN INFL EXP II KYPH -I

## 2019-11-20 PROCEDURE — 77030040361 HC SLV COMPR DVT MDII -B

## 2019-11-20 PROCEDURE — 77030008684 HC TU ET CUF COVD -B: Performed by: ANESTHESIOLOGY

## 2019-11-20 PROCEDURE — 74011000250 HC RX REV CODE- 250: Performed by: RADIOLOGY

## 2019-11-20 PROCEDURE — 77030021783 HC SYS CEM DEL MEDT -D

## 2019-11-20 PROCEDURE — 74011250637 HC RX REV CODE- 250/637: Performed by: STUDENT IN AN ORGANIZED HEALTH CARE EDUCATION/TRAINING PROGRAM

## 2019-11-20 PROCEDURE — 74011250636 HC RX REV CODE- 250/636: Performed by: ANESTHESIOLOGY

## 2019-11-20 PROCEDURE — C1713 ANCHOR/SCREW BN/BN,TIS/BN: HCPCS

## 2019-11-20 PROCEDURE — 80048 BASIC METABOLIC PNL TOTAL CA: CPT

## 2019-11-20 PROCEDURE — 76210000063 HC OR PH I REC FIRST 0.5 HR

## 2019-11-20 PROCEDURE — 74011250636 HC RX REV CODE- 250/636: Performed by: STUDENT IN AN ORGANIZED HEALTH CARE EDUCATION/TRAINING PROGRAM

## 2019-11-20 PROCEDURE — 77030026438 HC STYL ET INTUB CARD -A: Performed by: ANESTHESIOLOGY

## 2019-11-20 PROCEDURE — 82962 GLUCOSE BLOOD TEST: CPT

## 2019-11-20 PROCEDURE — 74011250637 HC RX REV CODE- 250/637: Performed by: INTERNAL MEDICINE

## 2019-11-20 RX ORDER — LIDOCAINE HYDROCHLORIDE 20 MG/ML
INJECTION, SOLUTION EPIDURAL; INFILTRATION; INTRACAUDAL; PERINEURAL AS NEEDED
Status: DISCONTINUED | OUTPATIENT
Start: 2019-11-20 | End: 2019-11-20 | Stop reason: HOSPADM

## 2019-11-20 RX ORDER — ACETAMINOPHEN 325 MG/1
650 TABLET ORAL ONCE
Status: CANCELLED | OUTPATIENT
Start: 2019-11-20 | End: 2019-11-20

## 2019-11-20 RX ORDER — SODIUM CHLORIDE 0.9 % (FLUSH) 0.9 %
5-40 SYRINGE (ML) INJECTION EVERY 8 HOURS
Status: CANCELLED | OUTPATIENT
Start: 2019-11-20

## 2019-11-20 RX ORDER — MIDAZOLAM HYDROCHLORIDE 1 MG/ML
INJECTION, SOLUTION INTRAMUSCULAR; INTRAVENOUS
Status: DISCONTINUED
Start: 2019-11-20 | End: 2019-11-21

## 2019-11-20 RX ORDER — FENTANYL CITRATE 50 UG/ML
INJECTION, SOLUTION INTRAMUSCULAR; INTRAVENOUS AS NEEDED
Status: DISCONTINUED | OUTPATIENT
Start: 2019-11-20 | End: 2019-11-20 | Stop reason: HOSPADM

## 2019-11-20 RX ORDER — MIDAZOLAM HYDROCHLORIDE 1 MG/ML
1 INJECTION, SOLUTION INTRAMUSCULAR; INTRAVENOUS AS NEEDED
Status: CANCELLED | OUTPATIENT
Start: 2019-11-20

## 2019-11-20 RX ORDER — SODIUM CHLORIDE 900 MG/100ML
INJECTION INTRAVENOUS
Status: DISPENSED
Start: 2019-11-20 | End: 2019-11-21

## 2019-11-20 RX ORDER — FENTANYL CITRATE 50 UG/ML
25 INJECTION, SOLUTION INTRAMUSCULAR; INTRAVENOUS
Status: CANCELLED | OUTPATIENT
Start: 2019-11-20

## 2019-11-20 RX ORDER — SODIUM CHLORIDE 0.9 % (FLUSH) 0.9 %
5-40 SYRINGE (ML) INJECTION EVERY 8 HOURS
Status: DISCONTINUED | OUTPATIENT
Start: 2019-11-20 | End: 2019-11-21 | Stop reason: HOSPADM

## 2019-11-20 RX ORDER — MIDAZOLAM HYDROCHLORIDE 1 MG/ML
1 INJECTION, SOLUTION INTRAMUSCULAR; INTRAVENOUS
Status: CANCELLED | OUTPATIENT
Start: 2019-11-20

## 2019-11-20 RX ORDER — FENTANYL CITRATE 50 UG/ML
100 INJECTION, SOLUTION INTRAMUSCULAR; INTRAVENOUS
Status: DISCONTINUED | OUTPATIENT
Start: 2019-11-20 | End: 2019-11-20 | Stop reason: HOSPADM

## 2019-11-20 RX ORDER — PROPOFOL 10 MG/ML
INJECTION, EMULSION INTRAVENOUS AS NEEDED
Status: DISCONTINUED | OUTPATIENT
Start: 2019-11-20 | End: 2019-11-20 | Stop reason: HOSPADM

## 2019-11-20 RX ORDER — SODIUM CHLORIDE 9 MG/ML
25 INJECTION, SOLUTION INTRAVENOUS CONTINUOUS
Status: DISCONTINUED | OUTPATIENT
Start: 2019-11-20 | End: 2019-11-20 | Stop reason: HOSPADM

## 2019-11-20 RX ORDER — MIDAZOLAM HYDROCHLORIDE 1 MG/ML
5 INJECTION, SOLUTION INTRAMUSCULAR; INTRAVENOUS
Status: DISCONTINUED | OUTPATIENT
Start: 2019-11-20 | End: 2019-11-20 | Stop reason: HOSPADM

## 2019-11-20 RX ORDER — FENTANYL CITRATE 50 UG/ML
200 INJECTION, SOLUTION INTRAMUSCULAR; INTRAVENOUS
Status: DISCONTINUED | OUTPATIENT
Start: 2019-11-20 | End: 2019-11-20

## 2019-11-20 RX ORDER — SODIUM CHLORIDE 9 MG/ML
INJECTION, SOLUTION INTRAVENOUS
Status: DISCONTINUED | OUTPATIENT
Start: 2019-11-20 | End: 2019-11-20 | Stop reason: HOSPADM

## 2019-11-20 RX ORDER — DEXTROSE, SODIUM CHLORIDE, SODIUM LACTATE, POTASSIUM CHLORIDE, AND CALCIUM CHLORIDE 5; .6; .31; .03; .02 G/100ML; G/100ML; G/100ML; G/100ML; G/100ML
125 INJECTION, SOLUTION INTRAVENOUS CONTINUOUS
Status: CANCELLED | OUTPATIENT
Start: 2019-11-20 | End: 2019-11-21

## 2019-11-20 RX ORDER — CEFAZOLIN SODIUM 1 G/3ML
2 INJECTION, POWDER, FOR SOLUTION INTRAMUSCULAR; INTRAVENOUS ONCE
Status: COMPLETED | OUTPATIENT
Start: 2019-11-20 | End: 2019-11-20

## 2019-11-20 RX ORDER — LIDOCAINE HYDROCHLORIDE 20 MG/ML
20 INJECTION, SOLUTION INFILTRATION; PERINEURAL ONCE
Status: COMPLETED | OUTPATIENT
Start: 2019-11-20 | End: 2019-11-20

## 2019-11-20 RX ORDER — SODIUM CHLORIDE 0.9 % (FLUSH) 0.9 %
5-40 SYRINGE (ML) INJECTION AS NEEDED
Status: DISCONTINUED | OUTPATIENT
Start: 2019-11-20 | End: 2019-11-21 | Stop reason: HOSPADM

## 2019-11-20 RX ORDER — OXYCODONE HYDROCHLORIDE 5 MG/1
5 TABLET ORAL AS NEEDED
Status: CANCELLED | OUTPATIENT
Start: 2019-11-20

## 2019-11-20 RX ORDER — FENTANYL CITRATE 50 UG/ML
50 INJECTION, SOLUTION INTRAMUSCULAR; INTRAVENOUS AS NEEDED
Status: CANCELLED | OUTPATIENT
Start: 2019-11-20

## 2019-11-20 RX ORDER — CEFTRIAXONE 1 G/1
INJECTION, POWDER, FOR SOLUTION INTRAMUSCULAR; INTRAVENOUS
Status: DISCONTINUED
Start: 2019-11-20 | End: 2019-11-21

## 2019-11-20 RX ORDER — ONDANSETRON 2 MG/ML
4 INJECTION INTRAMUSCULAR; INTRAVENOUS AS NEEDED
Status: CANCELLED | OUTPATIENT
Start: 2019-11-20

## 2019-11-20 RX ORDER — SODIUM CHLORIDE, SODIUM LACTATE, POTASSIUM CHLORIDE, CALCIUM CHLORIDE 600; 310; 30; 20 MG/100ML; MG/100ML; MG/100ML; MG/100ML
125 INJECTION, SOLUTION INTRAVENOUS CONTINUOUS
Status: CANCELLED | OUTPATIENT
Start: 2019-11-20

## 2019-11-20 RX ORDER — ROCURONIUM BROMIDE 10 MG/ML
INJECTION, SOLUTION INTRAVENOUS AS NEEDED
Status: DISCONTINUED | OUTPATIENT
Start: 2019-11-20 | End: 2019-11-20 | Stop reason: HOSPADM

## 2019-11-20 RX ORDER — MIDAZOLAM HYDROCHLORIDE 1 MG/ML
5 INJECTION, SOLUTION INTRAMUSCULAR; INTRAVENOUS
Status: DISCONTINUED | OUTPATIENT
Start: 2019-11-20 | End: 2019-11-20

## 2019-11-20 RX ORDER — MIDAZOLAM HYDROCHLORIDE 1 MG/ML
0.5 INJECTION, SOLUTION INTRAMUSCULAR; INTRAVENOUS
Status: DISCONTINUED | OUTPATIENT
Start: 2019-11-20 | End: 2019-11-21 | Stop reason: HOSPADM

## 2019-11-20 RX ORDER — SODIUM CHLORIDE 0.9 % (FLUSH) 0.9 %
5-40 SYRINGE (ML) INJECTION AS NEEDED
Status: CANCELLED | OUTPATIENT
Start: 2019-11-20

## 2019-11-20 RX ORDER — MIDAZOLAM HYDROCHLORIDE 1 MG/ML
INJECTION, SOLUTION INTRAMUSCULAR; INTRAVENOUS AS NEEDED
Status: DISCONTINUED | OUTPATIENT
Start: 2019-11-20 | End: 2019-11-20 | Stop reason: HOSPADM

## 2019-11-20 RX ORDER — FENTANYL CITRATE 50 UG/ML
INJECTION, SOLUTION INTRAMUSCULAR; INTRAVENOUS
Status: DISCONTINUED
Start: 2019-11-20 | End: 2019-11-21

## 2019-11-20 RX ORDER — SUCCINYLCHOLINE CHLORIDE 20 MG/ML
INJECTION INTRAMUSCULAR; INTRAVENOUS AS NEEDED
Status: DISCONTINUED | OUTPATIENT
Start: 2019-11-20 | End: 2019-11-20 | Stop reason: HOSPADM

## 2019-11-20 RX ORDER — DIPHENHYDRAMINE HYDROCHLORIDE 50 MG/ML
12.5 INJECTION, SOLUTION INTRAMUSCULAR; INTRAVENOUS AS NEEDED
Status: CANCELLED | OUTPATIENT
Start: 2019-11-20 | End: 2019-11-20

## 2019-11-20 RX ORDER — BUPIVACAINE HYDROCHLORIDE 5 MG/ML
30 INJECTION, SOLUTION EPIDURAL; INTRACAUDAL ONCE
Status: COMPLETED | OUTPATIENT
Start: 2019-11-20 | End: 2019-11-20

## 2019-11-20 RX ORDER — MIDAZOLAM HYDROCHLORIDE 1 MG/ML
0.5 INJECTION, SOLUTION INTRAMUSCULAR; INTRAVENOUS
Status: CANCELLED | OUTPATIENT
Start: 2019-11-20

## 2019-11-20 RX ORDER — EPHEDRINE SULFATE/0.9% NACL/PF 50 MG/5 ML
SYRINGE (ML) INTRAVENOUS AS NEEDED
Status: DISCONTINUED | OUTPATIENT
Start: 2019-11-20 | End: 2019-11-20 | Stop reason: HOSPADM

## 2019-11-20 RX ORDER — MORPHINE SULFATE 10 MG/ML
2 INJECTION, SOLUTION INTRAMUSCULAR; INTRAVENOUS
Status: CANCELLED | OUTPATIENT
Start: 2019-11-20

## 2019-11-20 RX ORDER — LIDOCAINE HYDROCHLORIDE 10 MG/ML
0.5 INJECTION, SOLUTION EPIDURAL; INFILTRATION; INTRACAUDAL; PERINEURAL AS NEEDED
Status: CANCELLED | OUTPATIENT
Start: 2019-11-20

## 2019-11-20 RX ORDER — FENTANYL CITRATE 50 UG/ML
25 INJECTION, SOLUTION INTRAMUSCULAR; INTRAVENOUS
Status: COMPLETED | OUTPATIENT
Start: 2019-11-20 | End: 2019-11-20

## 2019-11-20 RX ORDER — SODIUM CHLORIDE, SODIUM LACTATE, POTASSIUM CHLORIDE, CALCIUM CHLORIDE 600; 310; 30; 20 MG/100ML; MG/100ML; MG/100ML; MG/100ML
125 INJECTION, SOLUTION INTRAVENOUS CONTINUOUS
Status: CANCELLED | OUTPATIENT
Start: 2019-11-20 | End: 2019-11-21

## 2019-11-20 RX ADMIN — CHLORPROMAZINE HYDROCHLORIDE 100 MG: 50 TABLET, SUGAR COATED ORAL at 08:39

## 2019-11-20 RX ADMIN — STANDARDIZED SENNA CONCENTRATE AND DOCUSATE SODIUM 2 TABLET: 8.6; 5 TABLET ORAL at 08:39

## 2019-11-20 RX ADMIN — IOHEXOL 50 ML: 240 INJECTION, SOLUTION INTRATHECAL; INTRAVASCULAR; INTRAVENOUS; ORAL at 17:30

## 2019-11-20 RX ADMIN — Medication 10 MG: at 17:18

## 2019-11-20 RX ADMIN — MIDAZOLAM 0.5 MG: 1 INJECTION INTRAMUSCULAR; INTRAVENOUS at 19:04

## 2019-11-20 RX ADMIN — CEFAZOLIN 2 G: 330 INJECTION, POWDER, FOR SOLUTION INTRAMUSCULAR; INTRAVENOUS at 16:42

## 2019-11-20 RX ADMIN — ENOXAPARIN SODIUM 40 MG: 40 INJECTION SUBCUTANEOUS at 22:12

## 2019-11-20 RX ADMIN — LIDOCAINE HYDROCHLORIDE 60 MG: 20 INJECTION, SOLUTION EPIDURAL; INFILTRATION; INTRACAUDAL; PERINEURAL at 16:38

## 2019-11-20 RX ADMIN — BUPIVACAINE HYDROCHLORIDE 10 ML: 5 INJECTION, SOLUTION EPIDURAL; INTRACAUDAL; PERINEURAL at 17:29

## 2019-11-20 RX ADMIN — HUMAN INSULIN 2 UNITS: 100 INJECTION, SOLUTION SUBCUTANEOUS at 07:05

## 2019-11-20 RX ADMIN — FLUOXETINE 10 MG: 10 CAPSULE ORAL at 08:39

## 2019-11-20 RX ADMIN — FENTANYL CITRATE 25 MCG: 50 INJECTION, SOLUTION INTRAMUSCULAR; INTRAVENOUS at 16:38

## 2019-11-20 RX ADMIN — HYDROCODONE BITARTRATE AND ACETAMINOPHEN 1 TABLET: 7.5; 325 TABLET ORAL at 08:39

## 2019-11-20 RX ADMIN — CEFTRIAXONE 1 G: 1 INJECTION, POWDER, FOR SOLUTION INTRAMUSCULAR; INTRAVENOUS at 19:31

## 2019-11-20 RX ADMIN — Medication 10 ML: at 22:15

## 2019-11-20 RX ADMIN — SUCCINYLCHOLINE CHLORIDE 140 MG: 20 INJECTION INTRAMUSCULAR; INTRAVENOUS at 16:38

## 2019-11-20 RX ADMIN — Medication 10 ML: at 22:14

## 2019-11-20 RX ADMIN — HUMAN INSULIN 3 UNITS: 100 INJECTION, SOLUTION SUBCUTANEOUS at 22:00

## 2019-11-20 RX ADMIN — FENTANYL CITRATE 25 MCG: 50 INJECTION INTRAMUSCULAR; INTRAVENOUS at 19:11

## 2019-11-20 RX ADMIN — FENTANYL CITRATE 25 MCG: 50 INJECTION INTRAMUSCULAR; INTRAVENOUS at 19:47

## 2019-11-20 RX ADMIN — SODIUM CHLORIDE 25 ML/HR: 900 INJECTION, SOLUTION INTRAVENOUS at 14:14

## 2019-11-20 RX ADMIN — CHLORPROMAZINE HYDROCHLORIDE 100 MG: 50 TABLET, SUGAR COATED ORAL at 22:21

## 2019-11-20 RX ADMIN — FENTANYL CITRATE 25 MCG: 50 INJECTION INTRAMUSCULAR; INTRAVENOUS at 19:01

## 2019-11-20 RX ADMIN — FENTANYL CITRATE 25 MCG: 50 INJECTION INTRAMUSCULAR; INTRAVENOUS at 19:20

## 2019-11-20 RX ADMIN — LEVOTHYROXINE SODIUM 100 MCG: 100 TABLET ORAL at 06:57

## 2019-11-20 RX ADMIN — PROPOFOL 150 MG: 10 INJECTION, EMULSION INTRAVENOUS at 16:38

## 2019-11-20 RX ADMIN — SODIUM CHLORIDE: 9 INJECTION, SOLUTION INTRAVENOUS at 16:31

## 2019-11-20 RX ADMIN — FAMOTIDINE 20 MG: 20 TABLET ORAL at 07:01

## 2019-11-20 RX ADMIN — Medication 10 MG: at 17:08

## 2019-11-20 RX ADMIN — HUMAN INSULIN 5 UNITS: 100 INJECTION, SOLUTION SUBCUTANEOUS at 11:48

## 2019-11-20 RX ADMIN — FENTANYL CITRATE 25 MCG: 50 INJECTION, SOLUTION INTRAMUSCULAR; INTRAVENOUS at 16:53

## 2019-11-20 RX ADMIN — POTASSIUM CHLORIDE 40 MEQ: 750 TABLET, FILM COATED, EXTENDED RELEASE ORAL at 00:32

## 2019-11-20 RX ADMIN — Medication 10 MG: at 17:15

## 2019-11-20 RX ADMIN — LIDOCAINE HYDROCHLORIDE 10 ML: 20 INJECTION, SOLUTION INFILTRATION; PERINEURAL at 17:29

## 2019-11-20 RX ADMIN — Medication 10 ML: at 06:00

## 2019-11-20 RX ADMIN — MIDAZOLAM HYDROCHLORIDE 2 MG: 1 INJECTION, SOLUTION INTRAMUSCULAR; INTRAVENOUS at 16:31

## 2019-11-20 RX ADMIN — ATORVASTATIN CALCIUM 20 MG: 20 TABLET, FILM COATED ORAL at 08:39

## 2019-11-20 RX ADMIN — ROCURONIUM BROMIDE 10 MG: 10 INJECTION, SOLUTION INTRAVENOUS at 16:38

## 2019-11-20 RX ADMIN — Medication 10 MG: at 17:21

## 2019-11-20 RX ADMIN — FAMOTIDINE 20 MG: 20 TABLET ORAL at 22:12

## 2019-11-20 NOTE — PHYSICIAN ADVISORY
[facility] Physician Advisor Recommendation The information in this document is a recommendation to be used for utilization review and utilization management purposes only. This recommendation is not an order. The recommendation is made based on the information reviewed at the time of the referral, is pursuant to the [place] [facility] Conditions of Participation (42 CFR Part 482), and is neither a judgment nor an assessment with regard to the appropriateness or quality of clinical care. Nothing in this document may be used to limit, alter, or affect clinical services provided to the patient named below. The provider of services is ultimately responsible for the submission of a claim that has met all requirements for correct coding, billing, and reimbursement. Letter of Status Determination:  
Recommend hospitalization status upgraded from OBSERVATION  to INPATIENT  Status Pt Name:  Funmilayo Pierson MR#  
 [id number] / 
Payor: Giovannipadmini Amend / Plan: [address] / Product Type: Managed Care Medicaid /   
[facility]#  [id number] Room and Hospital  205/01  @ [facility]  
Hospitalization date  11/18/2019  4:15 PM  
Current Attending Physician  Miky Joyce 53 Principal diagnosis  Compression fracture of L2 ([facility] [address] [A21.291Q] UTI (urinary tract infection) [N39.0] Clinicals  63 y.o.  female hospitalized with above diagnosis Ms. James Hilliard is a 63year old patient with history of hypertension, DMII, obesity, and PTSD who sustained an acute L2 compression fracture after a ground level fall. There is <50% vertebral collapse and no evidence of retropulsion or canal compromise resulting from this fracture on today's MRI. Patient has intractable pain and would likely benefit from a kyphoplasty. Recommend IR consult for this procedure.  She has received iv pain meds x4 , she is in intractable pain and will need emergent Kyphoplasty. 
  
  
 Evelio (MCG) criteria Does apply Back pain severe requiring iv pain meds STATUS DETERMINATION  This patient is at high risk of adverse events and deterioration based on documented clinical data, comorbid conditions and current acute care course. Ms. Isabel Meyer is expected to meet Inpatient Admission status criteria in accordance with CMS regulation Section 43 .3. Specifically, due to medical necessity the patient's stay is expected to exceed Two Midnights. It is our recommendation that this patient's hospitalization status should be upgraded from  OBSERVATION to INPATIENT status. The final decision of the patient's hospitalization status depends on the attending physician's judgment. Additional comments Payor: Mushtaq Porter / Plan: [address] / Product Type: Managed Care Medicaid /   
  
Yuko Nair [facility] [employer] [address] [place] [address] T: [phone]      [email or web address]

## 2019-11-20 NOTE — PROGRESS NOTES
Bedside shift change report given to [address] [place] [address] (oncoming nurse) by Janelle Beatty (offgoing nurse). Report included the following information SBAR and Kardex.

## 2019-11-20 NOTE — PROGRESS NOTES
1405 pm- Patient arrived to Angio holding for procedure. Grand daughter at bedside to interpret. Patient only speaks Swedish (no [place]). Waiting for Dr. Marilyn Cardoso to talk with patient about procedure.

## 2019-11-20 NOTE — PROGRESS NOTES
Hospitalist Progress Note    NAME: Per Lopez   :  1960   MRN:  [id number]       Chief Complaint:   Back pain     History of present illness:     Patient is a 41-year-old female with medical history significant for hypertension, type 2 diabetes, obesity and PTSD who presents to the emergency department accompanied by daughter with chief complaint of back pain. Patient reports she fell on her lower back last Friday could not recall what happened after that but the next day her back was hurting and was not able to get out of bed. Daughter took her to [facility] emergency department, had imaging studies done, was given pain medication which she was told that nothing can be done to the back injury until 2019. Patient also complains of urinary dysuria and suprapubic discomfort but denies any fever, chills, nausea, vomiting, shortness of breath, chest pain, weakness, paresthesia, urinary or bowel incontinence.     In the emergency department, V/S were remarkable for tachycardia. Lab work-ups: K3.3  glucose 339. CT revealed Acute versus subacute L2 partial compression fracture. Vani Mehdi L5-S1 grade 1 anterolisthesis due to bilateral L5 spondylolysis. Moderatebilateral foraminal stenosis at L5-S1. PMH/PSH:    Assessment / Plan:      FAMILY MEMBER IN THE ROOM HELPING WITH TRANSLATION    Low back pain /10 well managed  Likely 2/2 L2 partial compression fracture and underlying DJD  CT revealed Acute versus subacute L2 partial compression fracture. No bony retropulsion. L5-S1 grade 1 anterolisthesis due to bilateral L5 spondylolysis. Moderatebilateral foraminal stenosis at L5-S1. MRI \" Mild superior end plate compression deformity at L2. Less than 50% loss  vertebral body height. No cortical retropulsion or epidural hematoma. Fracture  is amenable to percutaneous kyphoplasty. Scattered, likely benign T1 hypointense  foci within the bone marrow.  Bone sampling at the time of intervention is  Recommended. Spondylolysis and spondylolisthesis at L5-S1. The canal is patent. Moderate to severe bilateral foraminal stenoses at L5-S1. \"     Per NSX \"Patient has intractable pain and would likely benefit from a kyphoplasty. Recommend IR consult for this procedure. \"      IR consult in place    Pain Control     Type 2 DM BS this am 195  She is not eating that well so will continue to Hold Oral Meds  in house  RISSI  Accu-Checks     Acute UTI  In the setting of dysuria and suprapubic discomfort  U/a remarkable for bacteriuria, pyuria positive for leukocyte esterase and nitrites  Continue ceftriaxone     Hypokalemia Resolved 4.2 today\  AM labs H    Hyponatremia 142  Corrected  Likely secondary to volume depletion  Expect correction following IV fluid hydration  Consider further work-up if no improvement     H/o hypertension WNL  Not in any medication  PRN Hydralazine    Body mass index is 29.58 kg/m². Code status: Full  Prophylaxis: Yes  Recommended Disposition: SNF/Acute rehab     Subjective:     Chief Complaint / Reason for Physician Visit  Back painDiscussed with RN events overnight. Review of Systems:  Symptom Y/N Comments  Symptom Y/N Comments   Fever/Chills n   Chest Pain n    Poor Appetite y   Edema     Cough    Abdominal Pain n    Sputum    Joint Pain     SOB/CARDOZA n   Pruritis/Rash     Nausea/vomit n   Tolerating PT/OT     Diarrhea    Tolerating Diet     Constipation    Other       Could NOT obtain due to:      Objective:     VITALS:   Last 24hrs VS reviewed since prior progress note.  Most recent are:  Patient Vitals for the past 24 hrs:   Temp Pulse Resp BP SpO2   11/20/19 0825 98.9 °F (37.2 °C) (!) 102 16 106/66 97 %   11/20/19 0300 98.3 °F (36.8 °C) 97 16 112/72 93 %   11/19/19 2109 98.6 °F (37 °C) 98 15 118/74 96 %   11/19/19 1518 98.1 °F (36.7 °C) 99 18 116/76 96 %       Intake/Output Summary (Last 24 hours) at 11/20/2019 0949  Last data filed at 11/19/2019 1840  Gross per 24 hour   Intake 800 ml   Output    Net 800 ml        PHYSICAL EXAM:  General: WD, WN. Alert, cooperative, no acute distress    EENT:  EOMI. Anicteric sclerae. MMM  Resp:  CTA bilaterally, no wheezing or rales. No accessory muscle use  CV:  Regular  rhythm,  No edema  GI:  Soft, Non distended, Non tender.  +Bowel sounds  Neurologic:  Alert and oriented X 3, normal speech, can move her feet, sensation ok  Psych:   Good insight. Not anxious nor agitated  Skin:  No rashes. No jaundice    Back: Pain to movement lumbar area    Reviewed most current lab test results and cultures  YES  Reviewed most current radiology test results   YES  Review and summation of old records today    NO  Reviewed patient's current orders and MAR    YES  PMH/SH reviewed - no change compared to H&P  ________________________________________________________________________  Care Plan discussed with:    Comments   Patient x    Family      RN x    Care Manager     Consultant                        Multidiciplinary team rounds were held today with , nursing, pharmacist and clinical coordinator. Patient's plan of care was discussed; medications were reviewed and discharge planning was addressed. ________________________________________________________________________  Total NON critical care TIME:  35    Minutes    Total CRITICAL CARE TIME Spent:   Minutes non procedure based      Comments   >50% of visit spent in counseling and coordination of care     ________________________________________________________________________  Darryl Diaz MD     Procedures: see electronic medical records for all procedures/Xrays and details which were not copied into this note but were reviewed prior to creation of Plan. LABS:  I reviewed today's most current labs and imaging studies.   Pertinent labs include:  Recent Labs     11/20/19  0339 11/19/19  0114 11/18/19  1731   WBC 5.2 6.6 8.1   HGB 12.5 13.9 14.3   HCT 39.0 43.9 44.6    230 234 Recent Labs     11/20/19  0339 11/19/19  0114 11/18/19  1731    137 133*   K 4.2 3.3* 3.3*   * 105 100   CO2 25 23 24   * 171* 339*   BUN 7 10 17   CREA 0.38* 0.38* 0.53*   CA 8.5 8.8 9.3   MG  --   --  1.9   ALB  --   --  3.2*   TBILI  --   --  0.5   SGOT  --   --  10*   ALT  --   --  12       Signed: Eri Nance MD

## 2019-11-20 NOTE — DIABETES MGMT
[facility]    DTC Progress Note    Recommendations/ Comments: Pt continues with hyperglycemia. Most results > 180 mg/dl and required 14 units of correction insulin in past 24 hours. If appropriate, please consider adding basal insulin  (wgt based: 0.2 units/kg=12.8 units daily)     Current hospital DM medication: lispro insulin correction scale    Chart reviewed on [address]. Patient is a 63 y.o. female with known  Type 2 Diabetes on oral agent (monotherapy): metformin 1000 mg bid (generic) at home. A1c:   Lab Results   Component Value Date/Time    Hemoglobin A1c 14.9 (H) 08/09/2019 11:20 AM    Hemoglobin A1c >15.5 (H) 05/03/2019 10:21 AM       Recent Glucose Results:   Lab Results   Component Value Date/Time     (H) 11/20/2019 03:39 AM    GLUCPOC 255 (H) 11/20/2019 11:33 AM    GLUCPOC 188 (H) 11/20/2019 07:01 AM    GLUCPOC 202 (H) 11/19/2019 09:50 PM        Lab Results   Component Value Date/Time    Creatinine 0.38 (L) 11/20/2019 03:39 AM     Estimated Creatinine Clearance: 79.9 mL/min (A) (by C-G formula based on SCr of 0.38 mg/dL (L)). Active Orders   Diet    DIET DIABETIC CONSISTENT CARB Regular; 2 GM NA (House Low NA)        PO intake:   Patient Vitals for the past 72 hrs:   % Diet Eaten   11/20/19 0835 100 %   11/19/19 1840 75 %   11/19/19 1246 75 %   11/19/19 0900 35 %       Will continue to follow as needed.     Thank you          Time spent: 3 min

## 2019-11-20 NOTE — CONSULTS
NEUROSURGERY SPINE CONSULT NOTE    Subjective:     Date of Consultation:  November 19, 2019  Referring Physician:  Dr. Claudy Dykes is a 63 y.o. female who is being seen for intractable back pain. Patient is Czech speaking and history is obtained from daughter at bedside. Patient was in her kitchen Friday when she developed blurred vision and fell on her lower back. She was able to get up herself and walked upstairs and got in bed. Her daughter checked on her the next day and the patient had not been able to get up due to severe pain. She presented to [facility] ED on 11/16 and CT revealed a L2 compression fracture. She was made an appointment with an orthopedist for the 27th and sent home with hydrocodone. Unfortunately her pain did not corie and she presented to the ED yesterday and admitted under observation. On exam today she is lying supine in bed in obvious pain with movement. She shares that her pain will sometimes radiate to her right flank with movement. She has no pain down the legs. She has no numbness or tingling. She can lift both legs off of the bed. She denies any changes in her bowel or bladder function. No recent fever, chills, appetite changes, or unintended weight loss. Patient Active Problem List    Diagnosis Date Noted    UTI (urinary tract infection) 11/18/2019    Compression fracture of L2 ([facility] [address] 11/18/2019     Family History   Problem Relation Age of Onset    Diabetes Mother     Heart Disease Mother       Social History     Tobacco Use    Smoking status: Never Smoker    Smokeless tobacco: Never Used   Substance Use Topics    Alcohol use: Not Currently     Past Medical History:   Diagnosis Date    Diabetes ([facility] [address]     HTN (hypertension)     Obesity (BMI 35.0-39.9 without comorbidity)     Psychiatric disorder     PTSD    Psychotic disorder ([address]       History reviewed. No pertinent surgical history.    Prior to Admission medications    Medication Sig Start Date End Date Taking? Authorizing Provider   chlorproMAZINE (THORAZINE) 100 mg tablet Take 100 mg by mouth three (3) times daily. 10/21/19  Yes Provider, Historical   HYDROcodone-acetaminophen (NORCO) 5-325 mg per tablet Take 1 Tab by mouth every six (6) hours as needed for Pain for up to 5 days. Max Daily Amount: 4 Tabs. 11/16/19 11/21/19 Yes Huyen Adam DO   senna-docusate (SENNA-S) 8.6-50 mg per tablet Take 2 Tabs by mouth daily for 30 days. 11/16/19 12/16/19 Yes Huyen Adam DO   metFORMIN ER (GLUCOPHAGE XR) 500 mg tablet Take 2 pills twice a day, with breakfast and dinner 11/13/19  Yes Juan Faye NP   glimepiride (AMARYL) 2 mg tablet Take 1 Tab by mouth every morning. 11/13/19  Yes Jameel Faye NP   FLUoxetine (PROZAC) 10 mg capsule Take 10 mg by mouth daily. 10/3/19  Yes Provider, Historical   levothyroxine (SYNTHROID) 100 mcg tablet Take 1 Tab by mouth Daily (before breakfast). 5/28/19  Yes Jameel Faye NP   raNITIdine (ZANTAC) 150 mg tablet Take 1 Tab by mouth two (2) times a day. 5/28/19  Yes Jameel Faye NP   atorvastatin (LIPITOR) 20 mg tablet Take 1 Tab by mouth daily.  5/17/19  Yes Jameel Faye NP     Current Facility-Administered Medications   Medication Dose Route Frequency    potassium chloride SR (KLOR-CON 10) tablet 40 mEq  40 mEq Oral Q6H    atorvastatin (LIPITOR) tablet 20 mg  20 mg Oral DAILY    FLUoxetine (PROzac) capsule 10 mg  10 mg Oral DAILY    levothyroxine (SYNTHROID) tablet 100 mcg  100 mcg Oral ACB    famotidine (PEPCID) tablet 20 mg  20 mg Oral BID    senna-docusate (PERICOLACE) 8.6-50 mg per tablet 2 Tab  2 Tab Oral DAILY    chlorproMAZINE (THORAZINE) tablet 100 mg  100 mg Oral TID    sodium chloride (NS) flush 5-40 mL  5-40 mL IntraVENous Q8H    sodium chloride (NS) flush 5-40 mL  5-40 mL IntraVENous PRN    acetaminophen (TYLENOL) tablet 650 mg  650 mg Oral Q4H PRN    ondansetron (ZOFRAN) injection 4 mg  4 mg IntraVENous Q4H PRN    enoxaparin (LOVENOX) injection 40 mg  40 mg SubCUTAneous Q24H    HYDROcodone-acetaminophen (NORCO) 7.5-325 mg per tablet 1 Tab  1 Tab Oral Q4H PRN    cefTRIAXone (ROCEPHIN) 1 g in 0.9% sodium chloride (MBP/ADV) 50 mL  1 g IntraVENous Q24H    0.9% sodium chloride infusion  100 mL/hr IntraVENous CONTINUOUS    insulin regular (NOVOLIN R, HUMULIN R) injection   SubCUTAneous AC&HS    glucose chewable tablet 16 g  4 Tab Oral PRN    glucagon (GLUCAGEN) injection 1 mg  1 mg IntraMUSCular PRN    dextrose 10% infusion 0-250 mL  0-250 mL IntraVENous PRN    morphine injection 2 mg  2 mg IntraVENous Q4H PRN      No Known Allergies     Review of Systems:  Pertinent items noted in history      Objective:     Patient Vitals for the past 8 hrs:   BP Temp Pulse Resp SpO2   19 1518 116/76 98.1 °F (36.7 °C) 99 18 96 %     Temp (24hrs), Av.9 °F (36.6 °C), Min:97.5 °F (36.4 °C), Max:98.1 °F (36.7 °C)      EXAM:   General:cooperative. NAD  CV: no edema appreciated in extremities. Peripheral pulses 2+   Neurologic: Face symmetric. Conjugate gaze. Moves all extremities. Sensation grossly intact. Full strength in bilateral lower extremities. No tremor. No clonus. Musculoskeletal: no spinal stepoffs. ttp over lumbar spine. No joint deformities    Imaging Review:    Lumbar spine MRI  Mild superior end plate compression deformity at L2. Less than 50% loss  vertebral body height. No cortical retropulsion or epidural hematoma. Fracture  is amenable to percutaneous kyphoplasty. Scattered, likely benign T1 hypointense  foci within the bone marrow. Bone sampling at the time of intervention is  recommended.     Spondylolysis and spondylolisthesis at L5-S1. The canal is patent. Moderate to  severe bilateral foraminal stenoses at L5-S1.     Labs:   Recent Results (from the past 24 hour(s))   GLUCOSE, POC    Collection Time: 19 10:52 PM   Result Value Ref Range    Glucose (POC) 160 (H) 65 - 100 mg/dL    Performed by [facility]    METABOLIC PANEL, BASIC    Collection Time: 11/19/19  1:14 AM   Result Value Ref Range    Sodium 137 136 - 145 mmol/L    Potassium 3.3 (L) 3.5 - 5.1 mmol/L    Chloride 105 97 - 108 mmol/L    CO2 23 21 - 32 mmol/L    Anion gap 9 5 - 15 mmol/L    Glucose 171 (H) 65 - 100 mg/dL    BUN 10 6 - 20 MG/DL    Creatinine 0.38 (L) 0.55 - 1.02 MG/DL    BUN/Creatinine ratio 26 (H) 12 - 20      GFR est AA >60 >60 ml/min/1.73m2    GFR est non-AA >60 >60 ml/min/1.73m2    Calcium 8.8 8.5 - 10.1 MG/DL   CBC W/O DIFF    Collection Time: 11/19/19  1:14 AM   Result Value Ref Range    WBC 6.6 3.6 - 11.0 K/uL    RBC 5.28 (H) 3.80 - 5.20 M/uL    HGB 13.9 11.5 - 16.0 g/dL    HCT 43.9 35.0 - 47.0 %    MCV 83.1 80.0 - 99.0 FL    MCH 26.3 26.0 - 34.0 PG    MCHC 31.7 30.0 - 36.5 g/dL    RDW 13.2 11.5 - 14.5 %    PLATELET 723 624 - 016 K/uL    MPV 11.4 8.9 - 12.9 FL    NRBC 0.0 0  WBC    ABSOLUTE NRBC 0.00 0.00 - 0.01 K/uL   GLUCOSE, POC    Collection Time: 11/19/19  6:16 AM   Result Value Ref Range    Glucose (POC) 215 (H) 65 - 100 mg/dL    Performed by [facility]    GLUCOSE, POC    Collection Time: 11/19/19 11:57 AM   Result Value Ref Range    Glucose (POC) 174 (H) 65 - 100 mg/dL    Performed by [address] [place], [place] [id number]., POC    Collection Time: 11/19/19  4:40 PM   Result Value Ref Range    Glucose (POC) 274 (H) 65 - 100 mg/dL    Performed by Lennard Habermann          Assessment/Plan:   Ms. Emily Min is a 63year old patient with history of hypertension, DMII, obesity, and PTSD who sustained an acute L2 compression fracture after a ground level fall. There is <50% vertebral collapse and no evidence of retropulsion or canal compromise resulting from this fracture on today's MRI. Patient has intractable pain and would likely benefit from a kyphoplasty. Recommend IR consult for this procedure. Plan above discussed with Dr. Leah Sorenson. Thank you for the opportunity to participate in this patient's care.      [address] [place] [address] Sofia Dodd, NP

## 2019-11-20 NOTE — ROUTINE PROCESS
TRANSFER - OUT REPORT: 
 
Verbal report given to Starr(name) on [address]  being transferred to [facility]) for routine progression of care Report consisted of patients Situation, Background, Assessment and  
Recommendations(SBAR). Information from the following report(s) SBAR, Procedure Summary and MAR was reviewed with the receiving nurse. Lines:  
Peripheral IV 11/18/19 Left Antecubital (Active) Site Assessment Clean, dry, & intact 11/20/2019  8:35 AM  
Phlebitis Assessment 0 11/20/2019  8:35 AM  
Infiltration Assessment 0 11/20/2019  8:35 AM  
Dressing Status Clean, dry, & intact 11/20/2019  8:35 AM  
Dressing Type Transparent 11/20/2019  8:35 AM  
Hub Color/Line Status Pink 11/20/2019  8:35 AM  
Action Taken Open ports on tubing capped 11/18/2019 10:00 PM  
Alcohol Cap Used Yes 11/20/2019  8:35 AM  
   
Peripheral IV 11/20/19 Right Wrist (Active) Opportunity for questions and clarification was provided. Patient transported with: 
 Registered Nurse,pulse ox, O2 per anesthesia and CRNA ,Harpal Montemayor Nurse, Maryjo Winston on [address] updated with plan of care Granddaughter waiting in pt's room for return to floor

## 2019-11-20 NOTE — ANESTHESIA POSTPROCEDURE EVALUATION
* No procedures listed *. MAC    Anesthesia Post Evaluation        Patient location during evaluation: PACU  Patient participation: complete - patient participated  Level of consciousness: awake and alert  Pain management: adequate  Airway patency: patent  Anesthetic complications: no  Cardiovascular status: acceptable  Respiratory status: acceptable  Hydration status: acceptable  Comments: I have seen and evaluated the patient and is ready for discharge. Ella Bowie MD    Post anesthesia nausea and vomiting:  none      Vitals Value Taken Time   BP     Temp     Pulse 109 11/20/2019  5:59 PM   Resp 21 11/20/2019  5:59 PM   SpO2 100 % 11/20/2019  5:59 PM   Vitals shown include unvalidated device data.

## 2019-11-20 NOTE — PROGRESS NOTES
Bedside and Verbal shift change report given to Eulogio (oncoming nurse) by Deep Campbell (offgoing nurse). Report included the following information SBAR, Kardex and MAR.

## 2019-11-20 NOTE — PERIOP NOTES
Pt does not speak [place]. I was given the contact of Dayton Christian at [phone] - call made to that number requested she call in me in pacu at [phone].

## 2019-11-20 NOTE — PROGRESS NOTES
1500 pm- Dr. Alfonso Dewitt in to talk with patient and grand daughter about procedure. Consent obtained with grand daughter and patient for Kyphoplasty L2 with anesthesia support. 1605 pm- Dr. Regis Syed (Anesthesia) in to evaluate patient for anesthesia administration. 1630 pm- Patient taken to Angio procedure room for kyphoplasty. For all VS and medication administration, please see anesthesia flow sheet. Jose Armando 89 pm- Adia Chamberlain [phone] (grand daughter) will wait in patient's room during procedure.

## 2019-11-21 VITALS
BODY MASS INDEX: 29.33 KG/M2 | HEART RATE: 92 BPM | DIASTOLIC BLOOD PRESSURE: 81 MMHG | OXYGEN SATURATION: 97 % | RESPIRATION RATE: 16 BRPM | WEIGHT: 155.2 LBS | TEMPERATURE: 98.5 F | SYSTOLIC BLOOD PRESSURE: 139 MMHG

## 2019-11-21 LAB
ANION GAP SERPL CALC-SCNC: 7 MMOL/L (ref 5–15)
BUN SERPL-MCNC: 5 MG/DL (ref 6–20)
BUN/CREAT SERPL: 12 (ref 12–20)
CALCIUM SERPL-MCNC: 8.7 MG/DL (ref 8.5–10.1)
CHLORIDE SERPL-SCNC: 106 MMOL/L (ref 97–108)
CO2 SERPL-SCNC: 26 MMOL/L (ref 21–32)
CREAT SERPL-MCNC: 0.43 MG/DL (ref 0.55–1.02)
ERYTHROCYTE [DISTWIDTH] IN BLOOD BY AUTOMATED COUNT: 13.4 % (ref 11.5–14.5)
GLUCOSE BLD STRIP.AUTO-MCNC: 153 MG/DL (ref 65–100)
GLUCOSE BLD STRIP.AUTO-MCNC: 333 MG/DL (ref 65–100)
GLUCOSE SERPL-MCNC: 202 MG/DL (ref 65–100)
HCT VFR BLD AUTO: 39.2 % (ref 35–47)
HGB BLD-MCNC: 12.4 G/DL (ref 11.5–16)
MCH RBC QN AUTO: 26.5 PG (ref 26–34)
MCHC RBC AUTO-ENTMCNC: 31.6 G/DL (ref 30–36.5)
MCV RBC AUTO: 83.8 FL (ref 80–99)
NRBC # BLD: 0 K/UL (ref 0–0.01)
NRBC BLD-RTO: 0 PER 100 WBC
PLATELET # BLD AUTO: 239 K/UL (ref 150–400)
PMV BLD AUTO: 11.4 FL (ref 8.9–12.9)
POTASSIUM SERPL-SCNC: 3.9 MMOL/L (ref 3.5–5.1)
RBC # BLD AUTO: 4.68 M/UL (ref 3.8–5.2)
SERVICE CMNT-IMP: ABNORMAL
SERVICE CMNT-IMP: ABNORMAL
SODIUM SERPL-SCNC: 139 MMOL/L (ref 136–145)
WBC # BLD AUTO: 6.3 K/UL (ref 3.6–11)

## 2019-11-21 PROCEDURE — 80048 BASIC METABOLIC PNL TOTAL CA: CPT

## 2019-11-21 PROCEDURE — 36415 COLL VENOUS BLD VENIPUNCTURE: CPT

## 2019-11-21 PROCEDURE — 85027 COMPLETE CBC AUTOMATED: CPT

## 2019-11-21 PROCEDURE — 97116 GAIT TRAINING THERAPY: CPT

## 2019-11-21 PROCEDURE — 74011636637 HC RX REV CODE- 636/637: Performed by: STUDENT IN AN ORGANIZED HEALTH CARE EDUCATION/TRAINING PROGRAM

## 2019-11-21 PROCEDURE — 82962 GLUCOSE BLOOD TEST: CPT

## 2019-11-21 PROCEDURE — 74011250637 HC RX REV CODE- 250/637: Performed by: STUDENT IN AN ORGANIZED HEALTH CARE EDUCATION/TRAINING PROGRAM

## 2019-11-21 PROCEDURE — 97161 PT EVAL LOW COMPLEX 20 MIN: CPT

## 2019-11-21 RX ORDER — CEPHALEXIN 500 MG/1
500 CAPSULE ORAL EVERY 8 HOURS
Status: DISCONTINUED | OUTPATIENT
Start: 2019-11-21 | End: 2019-11-21 | Stop reason: HOSPADM

## 2019-11-21 RX ORDER — INSULIN GLARGINE 100 [IU]/ML
10 INJECTION, SOLUTION SUBCUTANEOUS
Status: DISCONTINUED | OUTPATIENT
Start: 2019-11-21 | End: 2019-11-21 | Stop reason: HOSPADM

## 2019-11-21 RX ORDER — CEPHALEXIN 500 MG/1
500 CAPSULE ORAL EVERY 8 HOURS
Qty: 9 CAP | Refills: 0 | Status: SHIPPED | OUTPATIENT
Start: 2019-11-21 | End: 2019-11-24

## 2019-11-21 RX ADMIN — STANDARDIZED SENNA CONCENTRATE AND DOCUSATE SODIUM 2 TABLET: 8.6; 5 TABLET ORAL at 09:38

## 2019-11-21 RX ADMIN — CHLORPROMAZINE HYDROCHLORIDE 100 MG: 50 TABLET, SUGAR COATED ORAL at 09:38

## 2019-11-21 RX ADMIN — Medication 10 ML: at 07:05

## 2019-11-21 RX ADMIN — FAMOTIDINE 20 MG: 20 TABLET ORAL at 07:05

## 2019-11-21 RX ADMIN — FLUOXETINE 10 MG: 10 CAPSULE ORAL at 09:38

## 2019-11-21 RX ADMIN — HUMAN INSULIN 7 UNITS: 100 INJECTION, SOLUTION SUBCUTANEOUS at 11:30

## 2019-11-21 RX ADMIN — HYDROCODONE BITARTRATE AND ACETAMINOPHEN 1 TABLET: 7.5; 325 TABLET ORAL at 07:09

## 2019-11-21 RX ADMIN — ATORVASTATIN CALCIUM 20 MG: 20 TABLET, FILM COATED ORAL at 09:38

## 2019-11-21 RX ADMIN — LEVOTHYROXINE SODIUM 100 MCG: 100 TABLET ORAL at 07:05

## 2019-11-21 NOTE — PROGRESS NOTES
Orders received, chart reviewed and patient evaluated by physical therapy. Daughter present and translated for pt. Upon arrival to patient's room - pt lying in bed - smiling - daughter explained to patient that MD ordered PT evaluation to clear for discharge home. Pt became immediately upset, crying, yelling and pointing at therapist.  Daughter explained to patient that to go home she needed to walk and try stairs - bedroom on 2nd floor. Pt independent supine to sit; daughter demonstrated proper donning of back brace, pt amb 120 feet with supervision only - pt became increasing upset, yelling - therapist supervised pt back to room. Nurse, Rick Hill - joined conversation - per daughter pt does not want to return home or get OOB - likes being in hospital.  Unable to complete formal PT eval or clear for steps - secondary pt's agitation. MD notified by nursing. Per chart - pt with psych history. While staff out of room - pt donned clothes independently. Based on limited evaluation - no skilled PT needs identified - pt safe and independent ambulating in hallway - and daughter instructed and able to recall back precautions - use of back brace when OOB, no lifting, twisting or bending. Full evaluation to follow.    Rahul Gan, PT

## 2019-11-21 NOTE — PROGRESS NOTES
Orders received, chart reviewed and patient completed dressing ADLs independently. Recommend discharge home to familiar environment to maintain independence and decrease patient agitation. Handout will be provided to daughter on back contraindications during ADLs to assist with this transition. Discharging skilled acute OT services at this time. Thank you.

## 2019-11-21 NOTE — PROGRESS NOTES
PHYSICAL THERAPY EVALUATION/DISCHARGE  Patient: Valarie Chapin (20 y.o. female)  Date: 11/21/2019  Primary Diagnosis: Compression fracture of L2 ([address] [S32.020A]  UTI (urinary tract infection) [N39.0]  Lumbar pain [M54.5]       Precautions: Fall, Spinal      ASSESSMENT  Based on the objective data described below, the patient presents POD # 1 - Kyphoplasty:  Upon arrival to patient's room - pt lying in bed - smiling - daughter explained to patient that MD ordered PT evaluation to clear for discharge home. Pt became immediately upset, crying, yelling and pointing at therapist.  Daughter explained to patient that to go home she needed to walk and try stairs - bedroom on 2nd floor. Pt independent supine to sit; daughter demonstrated proper donning of back brace, pt amb 120 feet with supervision only - pt became increasing upset, yelling - therapist supervised pt back to room. Nurse, Dennis Lopez - joined conversation - per daughter pt does not want to return home or get OOB - likes being in hospital.  Unable to complete formal PT eval or clear for steps - secondary pt's agitation. MD notified by nursing. Per chart - pt with psych history. While staff out of room - pt donned clothes independently. Pt appears safe and independent with ambulation with back brace with out assistive device.     Based on limited evaluation - no skilled PT needs identified - pt safe and independent ambulating in hallway - and daughter instructed and able to recall back precautions - use of back brace when OOB, no lifting, twisting or bending . Other factors to consider for discharge:      Further skilled acute physical therapy is not indicated at this time. PLAN :  Recommendation for discharge: (in order for the patient to meet his/her long term goals)  No skilled physical therapy/ follow up rehabilitation needs identified at this time.     This discharge recommendation:  Has been made in collaboration with the attending provider and/or case management    IF patient discharges home will need the following DME: none       SUBJECTIVE:       OBJECTIVE DATA SUMMARY:   HISTORY:    Past Medical History:   Diagnosis Date    Diabetes ([address]     HTN (hypertension)     Obesity (BMI 35.0-39.9 without comorbidity)     Psychiatric disorder     PTSD    Psychotic disorder [facility])      Past Surgical History:   Procedure Laterality Date    IR KYPHOPLASTY LUMBAR  11/20/2019       Prior level of function: Indep - per daughter patient spent most of time in bed  Personal factors and/or comorbidities impacting plan of care: psyc history    Home Situation  Home Environment: Private residence  # Steps to Enter: 3  Rails to Enter: Yes  One/Two Story Residence: Two story  # of Interior Steps: 15  Interior Rails: Right  Living Alone: No  Support Systems: Family member(s)  Patient Expects to be Discharged to[de-identified] Private residence  Current DME Used/Available at Home: None    EXAMINATION/PRESENTATION/DECISION MAKING:   Critical Behavior:  Neurologic State: Alert  Orientation Level: Oriented X4  Cognition: Follows commands     Hearing: Auditory  Auditory Impairment: None  Range Of Motion:  AROM: Within functional limits                       Strength:    Strength: Within functional limits                    Tone & Sensation:   Tone: Normal                              Coordination:  Coordination: Within functional limits  Functional Mobility:  Bed Mobility:  Rolling: Modified independent  Supine to Sit: Modified independent  Sit to Supine: Modified independent  Scooting: Modified independent  Transfers:  Sit to Stand: Independent  Stand to Sit: Independent                       Balance:   Sitting: Intact; Without support  Standing: Intact; Without support  Ambulation/Gait Training:  Distance (ft): 125 Feet (ft)  Assistive Device: Gait belt  Ambulation - Level of Assistance: Contact guard assistance        Gait Abnormalities: Decreased step clearance Speed/Bruna: (WNL)  Step Length: Right shortened;Left shortened           Physical Therapy Evaluation Charge Determination   History Examination Presentation Decision-Making   LOW Complexity : Zero comorbidities / personal factors that will impact the outcome / POC LOW Complexity : 1-2 Standardized tests and measures addressing body structure, function, activity limitation and / or participation in recreation  LOW Complexity : Stable, uncomplicated  LOW Complexity : FOTO score of       Based on the above components, the patient evaluation is determined to be of the following complexity level: LOW     Pain Rating:  10/10 low back    Activity Tolerance:   Fair  Please refer to the flowsheet for vital signs taken during this treatment. After treatment patient left in no apparent distress:   Caregiver / family present and pt sitting edge of bed    COMMUNICATION/EDUCATION:   The patients plan of care was discussed with: Registered Nurse and . Fall prevention education was provided and the patient/caregiver indicated understanding.     Thank you for this referral.  Jonah Bernstein, PT   Time Calculation: 25 mins

## 2019-11-21 NOTE — DISCHARGE INSTRUCTIONS
Discharge Instructions       PATIENT ID: Catarina Richard  MRN: [id number]   YOB: 1960    DATE OF ADMISSION: 11/18/2019  4:15 PM    DATE OF DISCHARGE: 11/21/2019    PRIMARY CARE PROVIDER: Sammi Kidd NP     ATTENDING PHYSICIAN: Cortez Roberts MD  DISCHARGING PROVIDER: Kathe Chavarria MD    To contact this individual call [phone] and ask the  to page. If unavailable ask to be transferred the Adult Hospitalist Department. DISCHARGE DIAGNOSES     FAMILY MEMBER IN THE ROOM HELPING WITH TRANSLATION     Low back pain 1/10 well managed s/p Kyphoplasty 11/20/19  Likely 2/2 L2 partial compression fracture and underlying DJD  CT revealed Acute versus subacute L2 partial compression fracture. MRI \" Mild superior end plate compression deformity at L2. Less than 50% loss  vertebral body height. Pain Control  Doing better, walking in the hallways by herself.     Type 2 DM BS  Pt refuses to be on Insulin. Will resume home meds     Acute UTI due to [address]  Urine cx shows pan sensitive KLEBSIELLA PNEUMONIAE  DC ceftriaxone, change to PO Keflex     Hypokalemia Resolved      Hyponatremia:  resolved     H/o hypertension WNL  Not in any medication  PRN Hydralazine       CONSULTATIONS: IP CONSULT TO INTERVENTIONAL RADIOLOGY    PROCEDURES/SURGERIES: * No surgery found *    PENDING TEST RESULTS:   At the time of discharge the following test results are still pending: n/a    FOLLOW UP APPOINTMENTS:   Follow-up Information     Follow up With Specialties Details Why Contact Info    Sammi Kidd NP Family Practice Schedule an appointment as soon as possible for a visit in 1 week for post-hospitalization follow up, with in 7 days [address]  [phone]             ADDITIONAL CARE RECOMMENDATIONS:   · It is important that you take the medication exactly as they are prescribed.    · Keep your medication in the bottles provided by the pharmacist and keep a list of the medication names, dosages, and times to be taken in your wallet. · Do not take other medications without consulting your doctor. · No drinking alcohol or driving car or operating machinery if you are on narcotic pain medications. Donot take sedating mediations if you are sleepy or confused. · Fall Precautions  · Keep Well Hydrated  · Report to your medical provider if you feel you have  developed allergies to medications  · Follow up with your PCP or Consultant for medication adjustments and refills  · Monitor for signs of fevers,chills,bleeding,chest pain and seek medical attention if you do so. DIET: Diabetic Diet    ACTIVITY: Ambulate in house    WOUND CARE: n/a    EQUIPMENT needed: n/a      DISCHARGE MEDICATIONS:   See Medication Reconciliation Form    · It is important that you take the medication exactly as they are prescribed. · Keep your medication in the bottles provided by the pharmacist and keep a list of the medication names, dosages, and times to be taken in your wallet. · Do not take other medications without consulting your doctor. NOTIFY YOUR PHYSICIAN FOR ANY OF THE FOLLOWING:   Fever over 101 degrees for 24 hours. Chest pain, shortness of breath, fever, chills, nausea, vomiting, diarrhea, change in mentation, falling, weakness, bleeding. Severe pain or pain not relieved by medications. Or, any other signs or symptoms that you may have questions about. DISPOSITION:  x  Home With:   OT  PT  HH  RN       SNF/Inpatient Rehab/LTAC    Independent/assisted living    Hospice    Other:         Information obtained by :   I understand that if any problems occur once I am at home I am to contact my physician. I understand and acknowledge receipt of the instructions indicated above.                                                                                                                                              Physician's or R.N.'s Signature                                                                  Date/Time                                                                                                                                              Patient or Representative Signature                                                          Date/Time          Signed:   Jose Lindquist MD  11/21/2019  2:09 PM

## 2019-11-21 NOTE — PROGRESS NOTES
I have reviewed discharge instructions with the granddaughter. The guardian/granddaughter verbalized understanding. Current Discharge Medication List      START taking these medications    Details   cephALEXin (KEFLEX) 500 mg capsule Take 1 Cap by mouth every eight (8) hours for 3 days. Qty: 9 Cap, Refills: 0         CONTINUE these medications which have NOT CHANGED    Details   chlorproMAZINE (THORAZINE) 100 mg tablet Take 100 mg by mouth three (3) times daily. Refills: 2      HYDROcodone-acetaminophen (NORCO) 5-325 mg per tablet Take 1 Tab by mouth every six (6) hours as needed for Pain for up to 5 days. Max Daily Amount: 4 Tabs. Qty: 20 Tab, Refills: 0    Associated Diagnoses: Closed compression fracture of second lumbar vertebra, [facility]      senna-docusate (SENNA-S) 8.6-50 mg per tablet Take 2 Tabs by mouth daily for 30 days. Qty: 60 Tab, Refills: 0      metFORMIN ER (GLUCOPHAGE XR) 500 mg tablet Take 2 pills twice a day, with breakfast and dinner  Qty: 120 Tab, Refills: 2    Associated Diagnoses: Type 2 diabetes mellitus with hyperglycemia, without long-term current use of insulin (HCC)      glimepiride (AMARYL) 2 mg tablet Take 1 Tab by mouth every morning. Qty: 60 Tab, Refills: 2    Associated Diagnoses: Type 2 diabetes mellitus with hyperglycemia, without long-term current use of insulin (HCC)      FLUoxetine (PROZAC) 10 mg capsule Take 10 mg by mouth daily. Refills: 1      levothyroxine (SYNTHROID) 100 mcg tablet Take 1 Tab by mouth Daily (before breakfast). Qty: 30 Tab, Refills: 5      raNITIdine (ZANTAC) 150 mg tablet Take 1 Tab by mouth two (2) times a day. Qty: 60 Tab, Refills: 5      atorvastatin (LIPITOR) 20 mg tablet Take 1 Tab by mouth daily.   Qty: 30 Tab, Refills: 3    Associated Diagnoses: Mixed hyperlipidemia         STOP taking these medications       Blood-Glucose Meter monitoring kit Comments:   Reason for Stopping:         Insulin Needles, Disposable, 31 gauge x 5/16\" ndle Comments:   Reason for Stopping:         insulin glargine (LANTUS,BASAGLAR) 100 unit/mL (3 mL) inpn Comments:   Reason for Stopping:         insulin glargine (LANTUS,BASAGLAR) 100 unit/mL (3 mL) inpn Comments:   Reason for Stopping:

## 2019-11-21 NOTE — DISCHARGE SUMMARY
Inpatient hospitalist discharge summary                Brief Overview    PATIENT ID: Funmilayo Pierson    MRN: [id number]     YOB: 1960    Admitting Provider: Virginia Domingo MD    Discharging Provider: Ava Addison MD   To contact this individual call [phone] and ask the  to page. If unavailable ask to be transferred the Adult Hospitalist Department. PCP at discharge: Estelita Villalobos, [address]   [address] [facility] [address]    Admission date: 11/18/2019  Date of Discharge: 11/21/19    Chief complaint:   Chief Complaint   Patient presents with    Back Pain     Patient Active Problem List   Diagnosis Code    UTI (urinary tract infection) N39.0    Compression fracture of L2 ([address] S32.020A    Lumbar pain M54.5         Discharge diagnosis, hospital course/plan:  [id number]     Low back pain 1/10 well managed s/p Kyphoplasty 11/20/19  Likely 2/2 L2 partial compression fracture and underlying DJD  CT revealed Acute versus subacute L2 partial compression fracture. MRI \" Mild superior end plate compression deformity at L2. Less than 50% loss  vertebral body height. Pain Control  Doing better, walking in the hallways by herself.     Type 2 DM BS  Pt refuses to be on Insulin. Will resume home meds     Acute UTI due to [address]  Urine cx shows pan sensitive KLEBSIELLA PNEUMONIAE  DC ceftriaxone, change to PO Keflex     Hypokalemia Resolved      Hyponatremia:  resolved     H/o hypertension WNL  Not in any medication  PRN Hydralazine    On the date of discharge, diagnostic face to face encounter was performed. Patient was hemodynamically stable, offering no new complaints. Denies any shortness of breath at rest, no fevers or chills, no diarrhea or constipation. Patient is agreeable for discharge. Patient understood and verbalized the understanding of the discharge plan.     Patient was advised to seek medical help/ care or return to ED, if symptoms recur, worsen or new symptoms develop. Discharge Disposition:  Home or Self Care    Discharge activity:  Ambulate in house    Code status at discharge:  Full Code     Active issues requiring follow up:  DM2    Outpatient follow up:      Future appointments-  Future Appointments   Date Time Provider Jimy Joyce   2/4/2020  8:30 AM Luly Capone MD RDE PETAR [address] [facility] [address]     Follow-up Information     Follow up With Specialties Details Why Contact Info    Mary Pradhan NP Family Practice Schedule an appointment as soon as possible for a visit in 1 week for post-hospitalization follow up, with in 7 days [address]  [phone]            Test results pending upon discharge:  n/a    Operative procedures performed:      Treatments: IV hydration and antibiotics: ceftriaxone    Consults:  IP CONSULT TO INTERVENTIONAL RADIOLOGY    Procedures:    Kyphoplasty    Diet:  DIET DIABETIC CONSISTENT CARB    Pertinent test results:  Xr Spine Lumb 2 Or 3 V    Result Date: 11/16/2019  EXAM: XR SPINE LUMB 2 OR 3 V INDICATION: Low back pain after fall TECHNIQUE: AP, lateral, and coned-down lateral views of the lumbar spine. COMPARISON: None. FINDINGS: Compression fracture of the L2 vertebral body with approximately 25% loss of height. Ill-defined superior endplate fracture. No other fracture. Mild osteopenia. Grade 2 anterolisthesis of L5 on S1 measures 12 mm. Bilateral L5 spondylolysis. Moderate facet arthrosis L4-S1. Osteopenia. IMPRESSION: 1. Age-indeterminate L2 partial compression fracture may be acute. 2. L5-S1 grade 2 anterolisthesis due to L5 spondylolysis and facet arthrosis. Xr Hip Lt W Or Wo Pelv 2-3 Vws    Result Date: 11/16/2019  EXAM: XR HIP LT W OR WO PELV 2-3 VWS INDICATION: Left hip pain after ground-level fall. COMPARISON: None.  FINDINGS: An AP view of the pelvis and a frogleg lateral view of the left hip demonstrate no fracture, dislocation or other acute abnormality. Bilateral greater trochanteric enthesophytes. Bones are osteopenic. Radiodense foreign body in projection with the right iliac bone may be extracorporeal. Lumbar spine degenerative disc disease is partially imaged. Sacrum is not well evaluated due to bowel gas. IMPRESSION: No acute abnormality. Ir Kyphoplasty Lumbar    Result Date: 11/20/2019  EXAM:  IR KYPHOPLASTY LUMBAR INDICATION:  Relatively acute compression fracture \"L2\" due to trauma. Incapacitating pain. Not responding to conservative therapy. FLUOROSCOPY DOSE (PKA, DAP): 687.4 mGy. COMPARISON:  MRI lumbar spine 11/19/19, CT 11/16/19, lumbar spine x-ray 11/16/19 PREOP DIAGNOSIS: \"L2\" compression fracture with severe pain and disability. POSTOP DIAGNOSIS: \"L2\" compression fracture with severe pain and disability. PROCEDURE: Kyphoplasty at \"L2\" levels. Insertion of KyphX  HV-R bone cement using the Kyphon gone at \"L2\". ANESTHESIA: General anesthesia provided by anesthesiology. ANTIBIOTICS: 2 gram intravenous Ancef. COMPLICATIONS: None. BLOOD LOSS: Minimal. PROCEDURE: Patient does not speaking which in most information was obtained from the chart and from the patient's granddaughter who interpreted and no surgery history. She had a fall and subsequently developed severe debilitating pain. She has been to the ED/[facility]ian[facility] multiple times and given medications which are not adequately treating the patient and she is \"incapacitated\". Consults from neurosurgery spine service hospital service recommend urgent kyphoplasty. Review of imaging demonstrates an acute fracture at what has been labeled \"L2 but in fact probably represents L1 with a transitional sacralized L5. For conformity with previous imaging, the vertebral level be referred to as L2. Procedure was discussed and explained to the patient in detail and interpreted arteries and the patient's granddaughter.  All agreed to proceed and informed consent was given and signed. Because of history of PTSD, likely nonosteoporotic bone and direct communication issues, it was elected to proceed under anesthesia. Patient was placed prone on the biplane angiographic table. The patient was positioned prone on the table and the back was prepped and maximal sterile barrier technique was used. Biplane C-arm fluoroscopy was used. The \"L2\" pedicles and vertebra were aligned in anatomic positioning in frontal and lateral projections. The KyphX Express trocar was placed under fluoroscopic visualization from a right posterolateral approach into the L2 vertebral body. This was performed with a right peripedicular approach to the vertebral body. When the tip of the trocar was appropriately localized in the posterior one-third of the vertebral body a hand drill was advanced in the vertebral body under fluoroscopic guidance towards the anterior vertebral body creating a channel. This reached the midline and it was elected to try a single trocar treatment. . The Kyphon balloon tamp was inserted. The balloon the pressures were carefully monitored and a total of approximately 1.5 mL of saline was used to fill the balloon. This seem to create a nice void for cement placement. Under fluoroscopic biplane imaging the KyphX bone void fillers were used to in conjunction with the Kyphon injection gun injected the KyphX cement after it was allowed to become thickened after 8-10 minutes. . The vertebra was carefully filled resulting in filling of the void followed by interdigitation anteriorly and then posteriorly within the mid vertebral body resulting in a good result linking frontal and back across the fracture demonstrated on CT and MRI. Postprocedure compression was applied for approximately 3 minutes at the trocar sites for hemostasis.  Multiple digital images were obtained during the procedure and after the procedure frontal and lateral digital filming was obtained documenting the final results and the adjacent vertebra. The procedure was well tolerated without complication and only minimal blood loss. Post procedure the patient was extubated will be partially recovered in the back to prepost and/or room for final recovery. IMPRESSION: Successful kyphoplasty of \"L2\" fracture. Possibility of osteoporosis was discussed with the patient's granddaughter and she should be encouraged to pursue with patient's doctor's as an outpatient further evaluation as indicated for possible osteoporosis including obtaining DEXA scan. Mri Brain Wo Cont    Result Date: 10/23/2019  CLINICAL HISTORY: A fascia for one week INDICATION: aphasia x1 week. COMPARISON: CT 10/23/2019 TECHNIQUE: MR examination of the brain includes axial and sagittal T1, coronal T2, axial T2, axial FLAIR, axial gradient echo, axial DWI. CONTRAST: None FINDINGS: There is no intracranial mass, hemorrhage or evidence of acute infarction. IACs are symmetric in size. Vestibules and semicircular canals grossly unremarkable. Minimal scattered foci of increased T2 signal intensity in the corona radiata, centrum semiovale. Also noted in the periventricular white matter. The brain architecture is normal. There is no evidence of midline shift or mass-effect. There are no extra-axial fluid collections. There is no Chiari or syrinx. The pituitary and infundibulum are grossly unremarkable. There is no skull base mass. Cerebellopontine angles are grossly unremarkable. The major intracranial vascular flow-voids are unremarkable. The cavernous sinuses are symmetric. Optic chiasm and infundibulum grossly unremarkable. Orbits are grossly symmetric. Dural venous sinuses are grossly patent. Mucoperiosteal thickening of the inferior aspect of the right maxillary sinus. The mastoid air cells are well pneumatized and clear. IMPRESSION: Minimal chronic microvascular ischemic change.  No intracranial mass, hemorrhage or evidence of acute infarction. Mri Lumb Spine Wo Cont    Result Date: 11/19/2019  EXAM: MRI LUMB SPINE WO CONT Clinical history: Lumbar fracture INDICATION: Lumbar fracture. COMPARISON: None TECHNIQUE: MR imaging of the lumbar spine was performed using the following sequences: sagittal T1, T2, STIR;  axial T1, T2. CONTRAST:  None. FINDINGS: Mild acute superior endplate compression deformity at L2. Less than 50% loss vertebral height. There is no cortical retropulsion or epidural hematoma. There is a hemangioma at L1. Anterolisthesis of L5 on S1. Fully formed intervertebral disc at S1-S2. Spondylolysis and spondylolisthesis at L5-S1. No focal marrow lesion . No sacral fracture. Nonspecific T1 hypointense focus in the left iliac bone 7-35. Tiny T1 hypointense focus at T12. Most likely bone island. The conus medullaris terminates at L1. Signal and caliber of the distal spinal cord are within normal limits. The paraspinal soft tissues are within normal limits. Lower thoracic spine: No herniation or stenosis. L1-L2: No herniation or stenosis. L2-L3: No herniation or stenosis. L3-L4: No herniation or stenosis. L4-L5: Mild facet arthropathy. Ligament flavum hypertrophy. Canal and foramina are patent L5-S1: Spondylolysis and spondylolisthesis. Spondylolisthesis measures 7 mm. Uncovering of intervertebral disc space. The canal is patent. Moderate to severe bilateral foraminal stenoses. IMPRESSION: Mild superior end plate compression deformity at L2. Less than 50% loss vertebral body height. No cortical retropulsion or epidural hematoma. Fracture is amenable to percutaneous kyphoplasty. Scattered, likely benign T1 hypointense foci within the bone marrow. Bone sampling at the time of intervention is recommended. Spondylolysis and spondylolisthesis at L5-S1. The canal is patent. Moderate to severe bilateral foraminal stenoses at L5-S1. Ct Head Wo Cont    Result Date: 10/23/2019  EXAM: CT HEAD WO CONT INDICATION: Aphasia for 2 months. COMPARISON: None. CONTRAST: None. TECHNIQUE: Unenhanced CT of the head was performed using 5 mm images. Brain and bone windows were generated. CT dose reduction was achieved through use of a standardized protocol tailored for this examination and automatic exposure control for dose modulation. Adaptive statistical iterative reconstruction (ASIR) was utilized. FINDINGS: The ventricles and sulci are normal in size, shape and configuration and midline. There is no significant white matter disease. There is no intracranial hemorrhage, extra-axial collection, mass, mass effect or midline shift. The basilar cisterns are open. No acute infarct is identified. The bone windows demonstrate no abnormalities. The visualized portions of the paranasal sinuses and mastoid air cells are clear. IMPRESSION: None. Ct Spine Lumb Wo Cont    Result Date: 11/16/2019  EXAM:  CT LUMBAR SPINE WITHOUT  CONTRAST INDICATION: Low back pain, L2 fracture. COMPARISON: Lumbar spine views earlier this evening. CONTRAST: None. TECHNIQUE: Multislice helical CT of the lumbar spine was performed without intravenous contrast administration. Coronal and sagittal reconstructions were generated. CT dose reduction was achieved through use of a standardized protocol tailored for this examination and automatic exposure control for dose modulation. Adaptive statistical iterative reconstruction (ASIR) was utilized. FINDINGS: 9 mm grade 1 anterolisthesis of L5 on S1 in the supine position. Bilateral L5 spondylolysis. Comminuted compression fracture of the L2 vertebral body is acute or subacute. No bony retropulsion. Approximately 25% loss of height of the L2 vertebral body. No other fracture. No evidence of bone lesion. The paravertebral soft tissues are within normal limits. Lower thoracic spine: No herniation or stenosis. L1-L2: No herniation or stenosis. L2-L3: No herniation or stenosis. L3-L4: Diffuse disc bulge. No stenosis.  L4-L5: Diffuse disc bulge. No stenosis. L5-S1: Uncovered disc. Moderate bilateral foraminal stenosis. IMPRESSION: 1. Acute versus subacute L2 partial compression fracture. No bony retropulsion. Patient is a likely candidate for kyphoplasty. 2. L5-S1 grade 1 anterolisthesis due to bilateral L5 spondylolysis. Moderate bilateral foraminal stenosis at L5-S1. Xr Chest Port    Result Date: 11/18/2019  INDICATION: Fall, tachycardia. Portable AP semiupright view of the chest. There is no prior study for direct comparison. Cardiomediastinal silhouette is within normal limits. Lungs are clear bilaterally. Pleural spaces are normal. Osseous structures are intact. IMPRESSION: No acute cardiopulmonary disease.        Recent Results (from the past 336 hour(s))   EKG, 12 LEAD, INITIAL    Collection Time: 11/18/19  5:25 PM   Result Value Ref Range    Ventricular Rate 107 BPM    Atrial Rate 107 BPM    P-R Interval 134 ms    QRS Duration 82 ms    Q-T Interval 346 ms    QTC Calculation (Bezet) 461 ms    Calculated P Axis 43 degrees    Calculated R Axis 52 degrees    Calculated T Axis 8 degrees    Diagnosis       Sinus tachycardia with occasional premature ventricular complexes  Nonspecific T wave abnormality  When compared with ECG of 23-OCT-2019 16:21,  premature ventricular complexes are now present  Inverted T waves have replaced nonspecific T wave abnormality in Inferior   leads  Nonspecific T wave abnormality now evident in Anterolateral leads  Confirmed by Jasmin Sy MD. ([id number]) on 11/18/2019 8:47:21 PM     CBC WITH AUTOMATED DIFF    Collection Time: 11/18/19  5:31 PM   Result Value Ref Range    WBC 8.1 3.6 - 11.0 K/uL    RBC 5.39 (H) 3.80 - 5.20 M/uL    HGB 14.3 11.5 - 16.0 g/dL    HCT 44.6 35.0 - 47.0 %    MCV 82.7 80.0 - 99.0 FL    MCH 26.5 26.0 - 34.0 PG    MCHC 32.1 30.0 - 36.5 g/dL    RDW 13.3 11.5 - 14.5 %    PLATELET 366 536 - 243 K/uL    MPV 11.1 8.9 - 12.9 FL    NRBC 0.0 0  WBC    ABSOLUTE NRBC 0.00 0.00 - 0.01 K/uL NEUTROPHILS 66 32 - 75 %    LYMPHOCYTES 24 12 - 49 %    MONOCYTES 6 5 - 13 %    EOSINOPHILS 3 0 - 7 %    BASOPHILS 1 0 - 1 %    IMMATURE GRANULOCYTES 0 0.0 - 0.5 %    ABS. NEUTROPHILS 5.3 1.8 - 8.0 K/UL    ABS. LYMPHOCYTES 2.0 0.8 - 3.5 K/UL    ABS. MONOCYTES 0.5 0.0 - 1.0 K/UL    ABS. EOSINOPHILS 0.2 0.0 - 0.4 K/UL    ABS. BASOPHILS 0.1 0.0 - 0.1 K/UL    ABS. IMM. GRANS. 0.0 0.00 - 0.04 K/UL    DF AUTOMATED     METABOLIC PANEL, COMPREHENSIVE    Collection Time: 11/18/19  5:31 PM   Result Value Ref Range    Sodium 133 (L) 136 - 145 mmol/L    Potassium 3.3 (L) 3.5 - 5.1 mmol/L    Chloride 100 97 - 108 mmol/L    CO2 24 21 - 32 mmol/L    Anion gap 9 5 - 15 mmol/L    Glucose 339 (H) 65 - 100 mg/dL    BUN 17 6 - 20 MG/DL    Creatinine 0.53 (L) 0.55 - 1.02 MG/DL    BUN/Creatinine ratio 32 (H) 12 - 20      GFR est AA >60 >60 ml/min/1.73m2    GFR est non-AA >60 >60 ml/min/1.73m2    Calcium 9.3 8.5 - 10.1 MG/DL    Bilirubin, total 0.5 0.2 - 1.0 MG/DL    ALT (SGPT) 12 12 - 78 U/L    AST (SGOT) 10 (L) 15 - 37 U/L    Alk. phosphatase 116 45 - 117 U/L    Protein, total 7.1 6.4 - 8.2 g/dL    Albumin 3.2 (L) 3.5 - 5.0 g/dL    Globulin 3.9 2.0 - 4.0 g/dL    A-G Ratio 0.8 (L) 1.1 - 2.2     SAMPLES BEING HELD    Collection Time: 11/18/19  5:31 PM   Result Value Ref Range    SAMPLES BEING HELD  1 RED, 1 GANGA, 1 BC GANGA, 1 BC LAV     COMMENT        Add-on orders for these samples will be processed based on acceptable specimen integrity and analyte stability, which may vary by analyte.    MAGNESIUM    Collection Time: 11/18/19  5:31 PM   Result Value Ref Range    Magnesium 1.9 1.6 - 2.4 mg/dL   CULTURE, URINE    Collection Time: 11/18/19  5:45 PM   Result Value Ref Range    Special Requests: NO SPECIAL REQUESTS      Naselle Count >100,000  COLONIES/mL        Culture result: KLEBSIELLA PNEUMONIAE (A)         Susceptibility    Klebsiella pneumoniae - CASSIE     Amikacin ($) <=16 Susceptible ug/mL     Ampicillin/sulbactam ($) <=8/4 Susceptible ug/mL     Aztreonam ($$$$) <=4 Susceptible ug/mL     Cefazolin ($) <=8 Susceptible ug/mL     Cefepime ($$) <=4 Susceptible ug/mL     Cefotaxime <=2 Susceptible ug/mL     Ceftazidime ($) <=1 Susceptible ug/mL     Ceftriaxone ($) <=1 Susceptible ug/mL     Cefuroxime ($) <=4 Susceptible ug/mL     Ciprofloxacin ($) <=1 Susceptible ug/mL     Gentamicin ($) <=4 Susceptible ug/mL     Imipenem <=1 Susceptible ug/mL     Levofloxacin ($) <=2 Susceptible ug/mL     Meropenem ($$) <=1 Susceptible ug/mL     Nitrofurantoin 64 Intermediate ug/mL     Piperacillin/Tazobac ($) <=16 Susceptible ug/mL     Tobramycin ($) <=4 Susceptible ug/mL     Trimeth/Sulfa <=2/38 Susceptible ug/mL   URINALYSIS W/MICROSCOPIC    Collection Time: 11/18/19  5:54 PM   Result Value Ref Range    Color YELLOW/STRAW      Appearance CLOUDY (A) CLEAR      Specific gravity 1.027 1.003 - 1.030      pH (UA) 5.5 5.0 - 8.0      Protein TRACE (A) NEG mg/dL    Glucose >1,000 (A) NEG mg/dL    Ketone NEGATIVE  NEG mg/dL    Bilirubin NEGATIVE  NEG      Blood TRACE (A) NEG      Urobilinogen 1.0 0.2 - 1.0 EU/dL    Nitrites POSITIVE (A) NEG      Leukocyte Esterase SMALL (A) NEG      WBC >100 (H) 0 - 4 /hpf    RBC 0-5 0 - 5 /hpf    Epithelial cells FEW FEW /lpf    Bacteria 2+ (A) NEG /hpf    Hyaline cast 0-2 0 - 5 /lpf   URINE CULTURE HOLD SAMPLE    Collection Time: 11/18/19  5:54 PM   Result Value Ref Range    Urine culture hold        URINE ON HOLD IN MICROBIOLOGY DEPT FOR 3 DAYS. IF UNPRESERVED URINE IS SUBMITTED, IT CANNOT BE USED FOR ADDITIONAL TESTING AFTER 24 HRS, RECOLLECTION WILL BE REQUIRED.    GLUCOSE, POC    Collection Time: 11/18/19  7:15 PM   Result Value Ref Range    Glucose (POC) 242 (H) 65 - 100 mg/dL    Performed by [facility]    GLUCOSE, POC    Collection Time: 11/18/19 10:52 PM   Result Value Ref Range    Glucose (POC) 160 (H) 65 - 100 mg/dL    Performed by [address] [place] [address], BASIC    Collection Time: 11/19/19  1:14 AM   Result Value Ref Range    Sodium 137 136 - 145 mmol/L    Potassium 3.3 (L) 3.5 - 5.1 mmol/L    Chloride 105 97 - 108 mmol/L    CO2 23 21 - 32 mmol/L    Anion gap 9 5 - 15 mmol/L    Glucose 171 (H) 65 - 100 mg/dL    BUN 10 6 - 20 MG/DL    Creatinine 0.38 (L) 0.55 - 1.02 MG/DL    BUN/Creatinine ratio 26 (H) 12 - 20      GFR est AA >60 >60 ml/min/1.73m2    GFR est non-AA >60 >60 ml/min/1.73m2    Calcium 8.8 8.5 - 10.1 MG/DL   CBC W/O DIFF    Collection Time: 11/19/19  1:14 AM   Result Value Ref Range    WBC 6.6 3.6 - 11.0 K/uL    RBC 5.28 (H) 3.80 - 5.20 M/uL    HGB 13.9 11.5 - 16.0 g/dL    HCT 43.9 35.0 - 47.0 %    MCV 83.1 80.0 - 99.0 FL    MCH 26.3 26.0 - 34.0 PG    MCHC 31.7 30.0 - 36.5 g/dL    RDW 13.2 11.5 - 14.5 %    PLATELET 344 590 - 832 K/uL    MPV 11.4 8.9 - 12.9 FL    NRBC 0.0 0  WBC    ABSOLUTE NRBC 0.00 0.00 - 0.01 K/uL   GLUCOSE, POC    Collection Time: 11/19/19  6:16 AM   Result Value Ref Range    Glucose (POC) 215 (H) 65 - 100 mg/dL    Performed by CR SINHA    GLUCOSE, POC    Collection Time: 11/19/19 11:57 AM   Result Value Ref Range    Glucose (POC) 174 (H) 65 - 100 mg/dL    Performed by [address] [place], [place] [id number]., POC    Collection Time: 11/19/19  4:40 PM   Result Value Ref Range    Glucose (POC) 274 (H) 65 - 100 mg/dL    Performed by Kevin Goodwin    GLUCOSE, POC    Collection Time: 11/19/19  9:50 PM   Result Value Ref Range    Glucose (POC) 202 (H) 65 - 100 mg/dL    Performed by Bola Jimenez    CBC W/O DIFF    Collection Time: 11/20/19  3:39 AM   Result Value Ref Range    WBC 5.2 3.6 - 11.0 K/uL    RBC 4.66 3.80 - 5.20 M/uL    HGB 12.5 11.5 - 16.0 g/dL    HCT 39.0 35.0 - 47.0 %    MCV 83.7 80.0 - 99.0 FL    MCH 26.8 26.0 - 34.0 PG    MCHC 32.1 30.0 - 36.5 g/dL    RDW 13.4 11.5 - 14.5 %    PLATELET 412 992 - 078 K/uL    MPV 11.4 8.9 - 12.9 FL    NRBC 0.0 0  WBC    ABSOLUTE NRBC 0.00 0.00 - 3.19 K/uL   METABOLIC PANEL, BASIC    Collection Time: 11/20/19  3:39 AM   Result Value Ref Range Sodium 142 136 - 145 mmol/L    Potassium 4.2 3.5 - 5.1 mmol/L    Chloride 111 (H) 97 - 108 mmol/L    CO2 25 21 - 32 mmol/L    Anion gap 6 5 - 15 mmol/L    Glucose 195 (H) 65 - 100 mg/dL    BUN 7 6 - 20 MG/DL    Creatinine 0.38 (L) 0.55 - 1.02 MG/DL    BUN/Creatinine ratio 18 12 - 20      GFR est AA >60 >60 ml/min/1.73m2    GFR est non-AA >60 >60 ml/min/1.73m2    Calcium 8.5 8.5 - 10.1 MG/DL   GLUCOSE, POC    Collection Time: 11/20/19  7:01 AM   Result Value Ref Range    Glucose (POC) 188 (H) 65 - 100 mg/dL    Performed by Esteban Haines    GLUCOSE, POC    Collection Time: 11/20/19 11:33 AM   Result Value Ref Range    Glucose (POC) 255 (H) 65 - 100 mg/dL    Performed by Andree Robles    GLUCOSE, POC    Collection Time: 11/20/19  6:39 PM   Result Value Ref Range    Glucose (POC) 161 (H) 65 - 100 mg/dL    Performed by Chelly Garcia    GLUCOSE, POC    Collection Time: 11/20/19  9:58 PM   Result Value Ref Range    Glucose (POC) 254 (H) 65 - 100 mg/dL    Performed by Francisca Goodman    CBC W/O DIFF    Collection Time: 11/21/19  2:54 AM   Result Value Ref Range    WBC 6.3 3.6 - 11.0 K/uL    RBC 4.68 3.80 - 5.20 M/uL    HGB 12.4 11.5 - 16.0 g/dL    HCT 39.2 35.0 - 47.0 %    MCV 83.8 80.0 - 99.0 FL    MCH 26.5 26.0 - 34.0 PG    MCHC 31.6 30.0 - 36.5 g/dL    RDW 13.4 11.5 - 14.5 %    PLATELET 117 113 - 983 K/uL    MPV 11.4 8.9 - 12.9 FL    NRBC 0.0 0  WBC    ABSOLUTE NRBC 0.00 0.00 - 6.43 K/uL   METABOLIC PANEL, BASIC    Collection Time: 11/21/19  2:54 AM   Result Value Ref Range    Sodium 139 136 - 145 mmol/L    Potassium 3.9 3.5 - 5.1 mmol/L    Chloride 106 97 - 108 mmol/L    CO2 26 21 - 32 mmol/L    Anion gap 7 5 - 15 mmol/L    Glucose 202 (H) 65 - 100 mg/dL    BUN 5 (L) 6 - 20 MG/DL    Creatinine 0.43 (L) 0.55 - 1.02 MG/DL    BUN/Creatinine ratio 12 12 - 20      GFR est AA >60 >60 ml/min/1.73m2    GFR est non-AA >60 >60 ml/min/1.73m2    Calcium 8.7 8.5 - 10.1 MG/DL   GLUCOSE, POC    Collection Time: 11/21/19  7:14 AM Result Value Ref Range    Glucose (POC) 153 (H) 65 - 100 mg/dL    Performed by Bharti John    GLUCOSE, POC    Collection Time: 11/21/19 11:29 AM   Result Value Ref Range    Glucose (POC) 333 (H) 65 - 100 mg/dL    Performed by Kyra Calderón            Physical Exam on Discharge:    Discharge condition: fair    Vital signs:   Patient Vitals for the past 12 hrs:   Temp Pulse Resp BP SpO2   11/21/19 0810 98.7 °F (37.1 °C) 100 16 127/78 94 %   11/21/19 0243 98.5 °F (36.9 °C) 93 16 125/78 94 %       Lungs: clear to auscultation bilaterally    Current Discharge Medication List      START taking these medications    Details   cephALEXin (KEFLEX) 500 mg capsule Take 1 Cap by mouth every eight (8) hours for 3 days. Qty: 9 Cap, Refills: 0         CONTINUE these medications which have NOT CHANGED    Details   chlorproMAZINE (THORAZINE) 100 mg tablet Take 100 mg by mouth three (3) times daily. Refills: 2      HYDROcodone-acetaminophen (NORCO) 5-325 mg per tablet Take 1 Tab by mouth every six (6) hours as needed for Pain for up to 5 days. Max Daily Amount: 4 Tabs. Qty: 20 Tab, Refills: 0    Associated Diagnoses: Closed compression fracture of second lumbar vertebra, initial encounter ([facility] [address]      senna-docusate (SENNA-S) 8.6-50 mg per tablet Take 2 Tabs by mouth daily for 30 days. Qty: 60 Tab, Refills: 0      metFORMIN ER (GLUCOPHAGE XR) 500 mg tablet Take 2 pills twice a day, with breakfast and dinner  Qty: 120 Tab, Refills: 2    Associated Diagnoses: Type 2 diabetes mellitus with hyperglycemia, without long-term current use of insulin (HCC)      glimepiride (AMARYL) 2 mg tablet Take 1 Tab by mouth every morning. Qty: 60 Tab, Refills: 2    Associated Diagnoses: Type 2 diabetes mellitus with hyperglycemia, without long-term current use of insulin (HCC)      FLUoxetine (PROZAC) 10 mg capsule Take 10 mg by mouth daily.   Refills: 1      levothyroxine (SYNTHROID) 100 mcg tablet Take 1 Tab by mouth Daily (before breakfast). Qty: 30 Tab, Refills: 5      raNITIdine (ZANTAC) 150 mg tablet Take 1 Tab by mouth two (2) times a day. Qty: 60 Tab, Refills: 5      atorvastatin (LIPITOR) 20 mg tablet Take 1 Tab by mouth daily. Qty: 30 Tab, Refills: 3    Associated Diagnoses: Mixed hyperlipidemia         STOP taking these medications       Blood-Glucose Meter monitoring kit Comments:   Reason for Stopping:         Insulin Needles, Disposable, 31 gauge x 5/16\" ndle Comments:   Reason for Stopping:         insulin glargine (LANTUS,BASAGLAR) 100 unit/mL (3 mL) inpn Comments:   Reason for Stopping:         insulin glargine (LANTUS,BASAGLAR) 100 unit/mL (3 mL) inpn Comments:   Reason for Stopping:                  Total time spent on discharge planning, counseling and co-ordination of care:   31 minutes    Chelly Nevarez MD  11/21/19  2:11 PM

## 2019-11-21 NOTE — PROGRESS NOTES
TRANSFER - IN REPORT:    Verbal report received from [facility]) on [employer]  being received from [facility]) for routine progression of care      Report consisted of patients Situation, Background, Assessment and   Recommendations(SBAR). Information from the following report(s) OR Summary was reviewed with the receiving nurse. Opportunity for questions and clarification was provided. Assessment completed upon patients arrival to unit and care assumed.

## 2019-11-21 NOTE — PROGRESS NOTES
Transition of Care Plan     · Disposition TBD; likely home with family assistance, pending therapy evaluation   · Transport: family or TBD   · Continue Medical Care   · Follow up with PCP/Specialist     Acknowledged case management consult. Patient will go home with family support and transport to be provided by family.  CRM to assist with any other recommendations made by care team.     [place]     [phone]

## 2019-11-21 NOTE — PROGRESS NOTES
Hospitalist Progress Note    NAME: Meeta Poon   :  1960   MRN:  [id number]       Chief Complaint:   Back pain     History of present illness:     Patient is a 60-year-old female with medical history significant for hypertension, type 2 diabetes, obesity and PTSD who presents to the emergency department accompanied by daughter with chief complaint of back pain. Patient reports she fell on her lower back last Friday could not recall what happened after that but the next day her back was hurting and was not able to get out of bed. Daughter took her to [facility] emergency department, had imaging studies done, was given pain medication which she was told that nothing can be done to the back injury until 2019. Patient also complains of urinary dysuria and suprapubic discomfort but denies any fever, chills, nausea, vomiting, shortness of breath, chest pain, weakness, paresthesia, urinary or bowel incontinence.     In the emergency department, V/S were remarkable for tachycardia. Lab work-ups: K3.3  glucose 339. CT revealed Acute versus subacute L2 partial compression fracture. [address] L5-S1 grade 1 anterolisthesis due to bilateral L5 spondylolysis. Moderatebilateral foraminal stenosis at L5-S1. PMH/PSH:    Assessment / Plan:      FAMILY MEMBER IN THE ROOM HELPING WITH TRANSLATION    Low back pain 1/10 well managed s/p Kyphoplasty 19  Likely 2/2 L2 partial compression fracture and underlying DJD  CT revealed Acute versus subacute L2 partial compression fracture. MRI \" Mild superior end plate compression deformity at L2. Less than 50% loss  vertebral body height.    NSGY recs Kyphoplasty  Pain Control  Cont PT/OT/CM for DC dispo     Type 2 DM BS this am 195  She is not eating that well so will continue to Hold Oral Meds  in house  RISSI  Accu-Checks     Acute UTI due to KLEBSIELLA PNEUMONIAE  Urine cx shows pan sensitive KLEBSIELLA PNEUMONIAE  DC ceftriaxone, change to PO Keflex     Hypokalemia Resolved   Repeat labs in am    Hyponatremia:  resolved     H/o hypertension WNL  Not in any medication  PRN Hydralazine    Body mass index is 29.33 kg/m². Code status: Full  Prophylaxis: Yes  Recommended Disposition: SNF/Acute rehab- medically stable for DC     Subjective:     Chief Complaint / Reason for Physician Visit  Pt seen for FU of back pain:  Patient is awake, alert, speaks only Green[place]ic and her accent is as such that [email or web address]  could not understand her. No family by the bedside. Tried to use online translation as I can read [place]ic. In yes and no answers, she denies significant pain and was able to ambulate after the procedure. She feels much better. DW nursing staff. No family by the bedside. Will try to reach family over the phone and will try to have a bedside . Review of Systems:  Symptom Y/N Comments  Symptom Y/N Comments   Fever/Chills n   Chest Pain n    Poor Appetite y   Edema     Cough    Abdominal Pain n    Sputum    Joint Pain     SOB/CARDOZA n   Pruritis/Rash     Nausea/vomit n   Tolerating PT/OT     Diarrhea    Tolerating Diet     Constipation    Other       Could NOT obtain due to:      Objective:     VITALS:   Last 24hrs VS reviewed since prior progress note.  Most recent are:  Patient Vitals for the past 24 hrs:   Temp Pulse Resp BP SpO2   11/21/19 0243 98.5 °F (36.9 °C) 93 16 125/78 94 %   11/21/19 0030 98.9 °F (37.2 °C) (!) 105 15 123/70 95 %   11/20/19 2330 99.8 °F (37.7 °C) (!) 107 16 122/69 95 %   11/20/19 2150 97.7 °F (36.5 °C) (!) 105 16 128/80 96 %   11/20/19 2054 98.2 °F (36.8 °C) (!) 114 16 128/73 98 %   11/20/19 2005  (!) 103 13  94 %   11/20/19 2000  (!) 103 12 114/57 94 %   11/20/19 1945 98.6 °F (37 °C) (!) 107 15 115/62 96 %   11/20/19 1930  (!) 103 14 104/58 95 %   11/20/19 1915  100 11 120/60 97 %   11/20/19 1900  (!) 107 12 130/71 100 %   11/20/19 1845  (!) 109 14 113/60 100 %   11/20/19 1830  (!) 107 17 132/67 100 % 11/20/19 1815  (!) 105 12 127/62 100 %   11/20/19 1810  (!) 106 13 119/52 100 %   11/20/19 1805  (!) 105 15 117/58 100 %   11/20/19 1800  (!) 106 14 121/56 100 %   11/20/19 1758  (!) 104 9  100 %   11/20/19 1757 97.9 °F (36.6 °C)   (!) 147/101    11/20/19 1404 98.1 °F (36.7 °C) 93 12 139/79 98 %   11/20/19 0825 98.9 °F (37.2 °C) (!) 102 16 106/66 97 %       Intake/Output Summary (Last 24 hours) at 11/21/2019 0740  Last data filed at 11/20/2019 1945  Gross per 24 hour   Intake 1070 ml   Output    Net 1070 ml        PHYSICAL EXAM:  General: WD, WN. Alert, cooperative, no acute distress    EENT:  EOMI. Anicteric sclerae. MMM  Resp:  CTA bilaterally, no wheezing or rales. No accessory muscle use  CV:  Regular  rhythm,  No edema  GI:  Soft, Non distended, Non tender.  +Bowel sounds  Neurologic:  Alert and oriented X 3, normal speech, can move her feet, sensation ok  Psych:   Good insight. Not anxious nor agitated  Skin:  No rashes. No jaundice    Back: Pain to movement lumbar area    Reviewed most current lab test results and cultures  YES  Reviewed most current radiology test results   YES  Review and summation of old records today    NO  Reviewed patient's current orders and MAR    YES  PMH/SH reviewed - no change compared to H&P  ________________________________________________________________________  Care Plan discussed with:    Comments   Patient x    Family      RN x    Care Manager     Consultant                        Multidiciplinary team rounds were held today with , nursing, pharmacist and clinical coordinator. Patient's plan of care was discussed; medications were reviewed and discharge planning was addressed.      ________________________________________________________________________  Total NON critical care TIME:  35    Minutes    Total CRITICAL CARE TIME Spent:   Minutes non procedure based      Comments   >50% of visit spent in counseling and coordination of care ________________________________________________________________________  Kathe Chavarria MD     Procedures: see electronic medical records for all procedures/Xrays and details which were not copied into this note but were reviewed prior to creation of Plan. LABS:  I reviewed today's most current labs and imaging studies.   Pertinent labs include:  Recent Labs     11/21/19 0254 11/20/19 0339 11/19/19 0114   WBC 6.3 5.2 6.6   HGB 12.4 12.5 13.9   HCT 39.2 39.0 43.9    239 230     Recent Labs     11/21/19 0254 11/20/19 0339 11/19/19 0114 11/18/19  1731    142 137 133*   K 3.9 4.2 3.3* 3.3*    111* 105 100   CO2 26 25 23 24   * 195* 171* 339*   BUN 5* 7 10 17   CREA 0.43* 0.38* 0.38* 0.53*   CA 8.7 8.5 8.8 9.3   MG  --   --   --  1.9   ALB  --   --   --  3.2*   TBILI  --   --   --  0.5   SGOT  --   --   --  10*   ALT  --   --   --  12       Signed: Kathe Chavarria MD Consent: Written consent obtained. Risks include but not limited to lid/brow ptosis, bruising, swelling, diplopia, temporary effect, incomplete chemical denervation, asymmetry, and need for multiple treatments for optimum results.  Female patients deny current pregnancy and/or breast feeding.

## 2019-11-21 NOTE — PERIOP NOTES
TRANSFER - OUT REPORT:    Verbal report given to Laura Garrison on [address]  being transferred to [facility] for routine post - op       Report consisted of patients Situation, Background, Assessment and   Recommendations(SBAR). Time Pre op antibiotic given:1642  Anesthesia Stop time: 4373  Valiente Present on Transfer to floor:no  Order for Valiente on Chart:no  Discharge Prescriptions with Chart:no    Information from the following report(s) SBAR, OR Summary, Intake/Output and MAR was reviewed with the receiving nurse. Opportunity for questions and clarification was provided. Is the patient on 02? NO       L/Min        Other     Is the patient on a monitor? NO    Is the nurse transporting with the patient? NO    Surgical Waiting Area notified of patient's transfer from PACU?  YES      The following personal items collected during your admission accompanied patient upon transfer:   Dental Appliance:    Vision: Visual Aid: None  Hearing Aid:    Jewelry:    Clothing:    Other Valuables:    Valuables sent to safe:

## 2019-11-21 NOTE — DIABETES MGMT
[facility]    [facility] Progress Note    Recommendations/ Comments: If appropriate, please consider:  1) adding Lantus 10 units to address BG >170 mg/dl fasting. 2) adding Hgb A1c to next lab draw as last value > 3 months ago. Current hospital DM medication: Regular correctional insulin - normal sensitivity ac and hs. Chart reviewed on [employer]. Patient is a 63 y.o. female with known Type 2 Diabetes - hx of poorly controlled on oral medication on Metformin  mg x 2 tab bid, Glimepiride 2 mg daily am at home. A1c:   Lab Results   Component Value Date/Time    Hemoglobin A1c 14.9 (H) 08/09/2019 11:20 AM    Hemoglobin A1c >15.5 (H) 05/03/2019 10:21 AM       Recent Glucose Results:   Lab Results   Component Value Date/Time     (H) 11/21/2019 02:54 AM    GLUCPOC 153 (H) 11/21/2019 07:14 AM    GLUCPOC 254 (H) 11/20/2019 09:58 PM    GLUCPOC 161 (H) 11/20/2019 06:39 PM        Lab Results   Component Value Date/Time    Creatinine 0.43 (L) 11/21/2019 02:54 AM     Estimated Creatinine Clearance: 79.5 mL/min (A) (by C-G formula based on SCr of 0.43 mg/dL (L)). Active Orders   Diet    DIET DIABETIC CONSISTENT CARB Regular; 2 GM NA (House Low NA)        PO intake:   Patient Vitals for the past 72 hrs:   % Diet Eaten   11/21/19 0920 100 %   11/20/19 0835 100 %   11/19/19 1840 75 %   11/19/19 1246 75 %   11/19/19 0900 35 %       Will continue to follow as needed.     Thank you  Maddie Hoff RD CDE    Time spent: 5 minutes

## 2019-11-25 ENCOUNTER — OFFICE VISIT (OUTPATIENT)
Dept: FAMILY MEDICINE CLINIC | Age: 59
End: 2019-11-25

## 2019-11-25 VITALS
HEART RATE: 120 BPM | WEIGHT: 145 LBS | DIASTOLIC BLOOD PRESSURE: 63 MMHG | TEMPERATURE: 97.6 F | HEIGHT: 61 IN | BODY MASS INDEX: 27.38 KG/M2 | RESPIRATION RATE: 18 BRPM | OXYGEN SATURATION: 98 % | SYSTOLIC BLOOD PRESSURE: 105 MMHG

## 2019-11-25 DIAGNOSIS — R00.0 TACHYCARDIA: ICD-10-CM

## 2019-11-25 DIAGNOSIS — N39.0 URINARY TRACT INFECTION WITHOUT HEMATURIA, SITE UNSPECIFIED: ICD-10-CM

## 2019-11-25 DIAGNOSIS — F41.9 ANXIETY: ICD-10-CM

## 2019-11-25 LAB
BILIRUB UR QL STRIP: NORMAL
GLUCOSE UR-MCNC: NEGATIVE MG/DL
KETONES P FAST UR STRIP-MCNC: NORMAL MG/DL
PH UR STRIP: 6 [PH] (ref 4.6–8)
PROT UR QL STRIP: NEGATIVE
SP GR UR STRIP: 1.03 (ref 1–1.03)
UA UROBILINOGEN AMB POC: NORMAL (ref 0.2–1)
URINALYSIS CLARITY POC: CLEAR
URINALYSIS COLOR POC: NORMAL
URINE BLOOD POC: NEGATIVE
URINE LEUKOCYTES POC: NEGATIVE
URINE NITRITES POC: NEGATIVE

## 2019-11-25 NOTE — PROGRESS NOTES
HISTORY OF PRESENT ILLNESS  Darrel Galindo is a 63 y.o. female. HPI:Following up from [facility], admission form 11/27/19-11/21/19 for hypotension, tachycardia due to urinary tract infection. Patient sent to ER from this office for tachycardia and hypotension. She was found to have UTI due to [address], she was give keflex that she finished taking it   She comes in today complaining of lower back pain due to fracture caused by fall, and it is managed by Kyphoplasty, she has a back support ans has prescription for pain pills. Today she is accompanied by granddaughter comes in to recheck her urine, her heart rate is elevated and she looks anxious. Her granddaughter states that she is nervous since her mother decided to sen her back to [place]. She has extended family to take care of her in her country    Past Medical History:   Diagnosis Date    Diabetes ([facility] [address]     HTN (hypertension)     Obesity (BMI 35.0-39.9 without comorbidity)     Psychiatric disorder     PTSD    Psychotic disorder ([facility] [address]      Past Surgical History:   Procedure Laterality Date    IR KYPHOPLASTY LUMBAR  11/20/2019     No Known Allergies    Current Outpatient Medications:     chlorproMAZINE (THORAZINE) 100 mg tablet, Take 100 mg by mouth three (3) times daily. , Disp: , Rfl: 2    senna-docusate (SENNA-S) 8.6-50 mg per tablet, Take 2 Tabs by mouth daily for 30 days. , Disp: 60 Tab, Rfl: 0    metFORMIN ER (GLUCOPHAGE XR) 500 mg tablet, Take 2 pills twice a day, with breakfast and dinner, Disp: 120 Tab, Rfl: 2    glimepiride (AMARYL) 2 mg tablet, Take 1 Tab by mouth every morning., Disp: 60 Tab, Rfl: 2    FLUoxetine (PROZAC) 10 mg capsule, Take 10 mg by mouth daily. , Disp: , Rfl: 1    levothyroxine (SYNTHROID) 100 mcg tablet, Take 1 Tab by mouth Daily (before breakfast). , Disp: 30 Tab, Rfl: 5    raNITIdine (ZANTAC) 150 mg tablet, Take 1 Tab by mouth two (2) times a day., Disp: 60 Tab, Rfl: 5    atorvastatin (LIPITOR) 20 mg tablet, Take 1 Tab by mouth daily. , Disp: 30 Tab, Rfl: 3  Review of Systems   Constitutional: Negative. Respiratory: Negative. Cardiovascular: Negative. Gastrointestinal: Negative. Psychiatric/Behavioral: The patient is nervous/anxious. Blood pressure 105/63, pulse (!) 120, temperature 97.6 °F (36.4 °C), temperature source Oral, resp. rate 18, height 5' 1\" (1.549 m), weight 145 lb (65.8 kg), SpO2 98 %. Body mass index is 27.4 kg/m². Physical Exam  Constitutional:       Appearance: Normal appearance. HENT:      Mouth/Throat:      Mouth: Mucous membranes are moist.      Pharynx: Oropharynx is clear. Neck:      Musculoskeletal: Normal range of motion and neck supple. Cardiovascular:      Rate and Rhythm: Regular rhythm. Heart sounds: No murmur. Comments: Heart rate is elevated 120/minutes  Pulmonary:      Effort: Pulmonary effort is normal.      Breath sounds: Normal breath sounds. Genitourinary:     Comments: UA is clear from infection  Musculoskeletal:         General: Tenderness present. Comments: Using back brace   Neurological:      Mental Status: She is alert. ASSESSMENT and PLAN  Diagnoses and all orders for this visit:    1. Transition of care performed with sharing of clinical summary    2. Urinary tract infection without hematuria, site unspecified has cleared up  -     AMB POC URINALYSIS DIP STICK AUTO W/ MICRO        -     Urine has cleard up  3. Anxiety    4.  Tachycardia due to anxiety  Follow up in three months  Pt was given an after visit summary which includes diagnosis, current medicines and vital and voiced understanding of treatment plan

## 2019-11-25 NOTE — PROGRESS NOTES
Chief Complaint   Patient presents with   [facility] Follow Up     [facility] 11/18/2019-11/21/2019  DX: UTI/FX: Back pain     1. Have you been to the ER, urgent care clinic since your last visit? Hospitalized since your last visit? Yes [facility]    2. Have you seen or consulted any other health care providers outside of the [address] [place] [address] since your last visit? Include any pap smears or colon screening.  No

## 2019-12-01 DIAGNOSIS — E78.2 MIXED HYPERLIPIDEMIA: ICD-10-CM

## 2019-12-02 RX ORDER — ATORVASTATIN CALCIUM 20 MG/1
TABLET, FILM COATED ORAL
Qty: 30 TAB | Refills: 3 | Status: SHIPPED | OUTPATIENT
Start: 2019-12-02

## 2020-09-14 NOTE — ED NOTES
Pt continues to rest on stretcher awaiting Ct results with daughter at bedside. Call bell within reach. Yes

## 2022-03-18 PROBLEM — N39.0 UTI (URINARY TRACT INFECTION): Status: ACTIVE | Noted: 2019-11-18

## 2022-03-19 PROBLEM — M54.50 LUMBAR PAIN: Status: ACTIVE | Noted: 2019-11-20

## 2022-03-19 PROBLEM — S32.020A COMPRESSION FRACTURE OF L2 (HCC): Status: ACTIVE | Noted: 2019-11-18

## 2023-05-20 RX ORDER — GLIMEPIRIDE 2 MG/1
2 TABLET ORAL
COMMUNITY
Start: 2019-11-13

## 2023-05-20 RX ORDER — CHLORPROMAZINE HYDROCHLORIDE 100 MG/1
100 TABLET, FILM COATED ORAL 3 TIMES DAILY
COMMUNITY
Start: 2019-10-21

## 2023-05-20 RX ORDER — LEVOTHYROXINE SODIUM 0.1 MG/1
100 TABLET ORAL
COMMUNITY
Start: 2019-05-28

## 2023-05-20 RX ORDER — ATORVASTATIN CALCIUM 20 MG/1
1 TABLET, FILM COATED ORAL DAILY
COMMUNITY
Start: 2019-12-02

## 2023-05-20 RX ORDER — RANITIDINE 150 MG/1
150 TABLET ORAL 2 TIMES DAILY
COMMUNITY
Start: 2019-05-28

## 2023-05-20 RX ORDER — METFORMIN HYDROCHLORIDE 500 MG/1
TABLET, EXTENDED RELEASE ORAL
COMMUNITY
Start: 2019-11-13

## 2023-05-20 RX ORDER — FLUOXETINE 10 MG/1
10 CAPSULE ORAL DAILY
COMMUNITY
Start: 2019-10-03